# Patient Record
Sex: MALE | Race: WHITE | NOT HISPANIC OR LATINO | Employment: OTHER | URBAN - NONMETROPOLITAN AREA
[De-identification: names, ages, dates, MRNs, and addresses within clinical notes are randomized per-mention and may not be internally consistent; named-entity substitution may affect disease eponyms.]

---

## 2023-06-12 ENCOUNTER — OFFICE VISIT (OUTPATIENT)
Dept: UROLOGY | Facility: CLINIC | Age: 58
End: 2023-06-12
Payer: MEDICARE

## 2023-06-12 VITALS
DIASTOLIC BLOOD PRESSURE: 97 MMHG | WEIGHT: 219 LBS | BODY MASS INDEX: 29.66 KG/M2 | HEART RATE: 80 BPM | SYSTOLIC BLOOD PRESSURE: 123 MMHG | HEIGHT: 72 IN

## 2023-06-12 DIAGNOSIS — N48.1 BALANITIS: Primary | ICD-10-CM

## 2023-06-12 PROCEDURE — 1160F RVW MEDS BY RX/DR IN RCRD: CPT

## 2023-06-12 PROCEDURE — 99203 OFFICE O/P NEW LOW 30 MIN: CPT

## 2023-06-12 PROCEDURE — 1159F MED LIST DOCD IN RCRD: CPT

## 2023-06-12 RX ORDER — LORATADINE 10 MG/1
10 TABLET ORAL DAILY
COMMUNITY
Start: 2022-12-21

## 2023-06-12 RX ORDER — GABAPENTIN 400 MG/1
400 CAPSULE ORAL 3 TIMES DAILY
COMMUNITY
Start: 2023-01-04

## 2023-06-12 RX ORDER — LIRAGLUTIDE 6 MG/ML
0.6 INJECTION SUBCUTANEOUS
COMMUNITY
Start: 2023-04-14

## 2023-06-12 RX ORDER — NALOXONE HYDROCHLORIDE 4 MG/.1ML
1 SPRAY NASAL
COMMUNITY
Start: 2023-05-26

## 2023-06-12 RX ORDER — HYDROCODONE BITARTRATE AND ACETAMINOPHEN 7.5; 325 MG/1; MG/1
1 TABLET ORAL
COMMUNITY
Start: 2023-01-04

## 2023-06-12 RX ORDER — AMITRIPTYLINE HYDROCHLORIDE 10 MG/1
10 TABLET, FILM COATED ORAL
COMMUNITY

## 2023-06-12 RX ORDER — CLOTRIMAZOLE AND BETAMETHASONE DIPROPIONATE 10; .64 MG/G; MG/G
1 CREAM TOPICAL 2 TIMES DAILY
Qty: 45 G | Refills: 2 | Status: SHIPPED | OUTPATIENT
Start: 2023-06-12

## 2023-06-12 NOTE — PROGRESS NOTES
"Chief Complaint:    Chief Complaint   Patient presents with   • Balantis        Vital Signs:   /97 (BP Location: Right arm, Patient Position: Sitting)   Pulse 80   Ht 182.9 cm (72\")   Wt 99.3 kg (219 lb)   BMI 29.70 kg/m²   Body mass index is 29.7 kg/m².      HPI:  Jayden Cotto is a 57 y.o. male who presents today for initial evaluation     History of Present Illness  Mr. Cotto presents to the clinic for initial evaluation.  Patient reports that he has had difficulty retraction of foreskin and cracking of foreskin with notable white discharge.  Does have a past medical history significant for diabetes mellitus.  He also reports that recently he was diagnosed with syphilis by his local health department.  He received 2 injections of penicillin IM.  He was scheduled to follow-up for 1 injection however never kept appointment.  Physical exam today he has notable balanitis with difficulty retracting foreskin.  There is cracking/splitting noted around the end of the foreskin.  Patient does not have any other genital sores present at this time.  He has no other complaints.      Past Medical History:  Past Medical History:   Diagnosis Date   • Diabetes mellitus        Current Meds:  Current Outpatient Medications   Medication Sig Dispense Refill   • amitriptyline (ELAVIL) 10 MG tablet 1 tablet.     • gabapentin (NEURONTIN) 400 MG capsule Take 1 capsule by mouth 3 (Three) Times a Day.     • HYDROcodone-acetaminophen (NORCO) 7.5-325 MG per tablet 1 tablet.     • loratadine (CLARITIN) 10 MG tablet Take 1 tablet by mouth Daily.     • metFORMIN (GLUCOPHAGE) 1000 MG tablet 1 tablet.     • naloxone (NARCAN) 4 MG/0.1ML nasal spray 1 spray.     • Victoza 18 MG/3ML solution pen-injector injection 0.6 mg.     • clotrimazole-betamethasone (Lotrisone) 1-0.05 % cream Apply 1 application topically to the appropriate area as directed 2 (Two) Times a Day. 45 g 2     No current facility-administered medications for this visit.      "   Allergies:   No Known Allergies     Past Surgical History:  Past Surgical History:   Procedure Laterality Date   • HAND SURGERY Right        Social History:  Social History     Socioeconomic History   • Marital status: Single   Tobacco Use   • Smoking status: Every Day     Packs/day: 0.25     Types: Cigarettes   • Smokeless tobacco: Never   Vaping Use   • Vaping Use: Every day   • Substances: Nicotine   Substance and Sexual Activity   • Alcohol use: Never   • Drug use: Never   • Sexual activity: Never       Family History:  No family history on file.    Review of Systems:  Review of Systems   Constitutional: Negative for chills, fatigue, fever and unexpected weight change.   Respiratory: Negative for cough, chest tightness, shortness of breath and wheezing.    Cardiovascular: Negative for chest pain and leg swelling.   Gastrointestinal: Negative for abdominal pain, constipation, diarrhea, nausea and vomiting.   Genitourinary: Positive for penile pain and penile swelling. Negative for difficulty urinating, dysuria, frequency, testicular pain and urgency.   Musculoskeletal:  Negative for back pain and joint swelling.   Skin: Negative for rash.   Neurological:  Negative for dizziness and headaches.   Psychiatric/Behavioral: Negative for confusion and suicidal ideas.       Physical Exam:  Physical Exam  Constitutional:       General: He is not in acute distress.     Appearance: Normal appearance.   HENT:      Head: Normocephalic and atraumatic.      Nose: Nose normal.      Mouth/Throat:      Mouth: Mucous membranes are moist.   Eyes:      Conjunctiva/sclera: Conjunctivae normal.   Cardiovascular:      Rate and Rhythm: Normal rate and regular rhythm.      Pulses: Normal pulses.      Heart sounds: Normal heart sounds.   Pulmonary:      Effort: Pulmonary effort is normal.      Breath sounds: Normal breath sounds.   Abdominal:      General: Bowel sounds are normal.      Palpations: Abdomen is soft.      Tenderness: There  is no right CVA tenderness or left CVA tenderness.   Genitourinary:     Comments: Balanitis with notable white discharge and cracking of the foreskin with retraction.  Musculoskeletal:         General: Normal range of motion.      Cervical back: Normal range of motion.   Skin:     General: Skin is warm.   Neurological:      General: No focal deficit present.      Mental Status: He is alert and oriented to person, place, and time.   Psychiatric:         Mood and Affect: Mood normal.         Behavior: Behavior normal.         Thought Content: Thought content normal.         Judgment: Judgment normal.       Recent Image (CT and/or KUB):   CT Abdomen and Pelvis: No results found for this or any previous visit.     CT Stone Protocol: No results found for this or any previous visit.     KUB: No results found for this or any previous visit.       Labs:  Brief Urine Lab Results     None        No visits with results within 3 Month(s) from this visit.   Latest known visit with results is:   No results found for any previous visit.        Procedure: None  Procedures     I have reviewed and agree with the above PMH, PSH, FMH, social history, medications, allergies, and labs.     Assessment/Plan:   Problem List Items Addressed This Visit        Genitourinary and Reproductive     Balanitis - Primary    Relevant Medications    clotrimazole-betamethasone (Lotrisone) 1-0.05 % cream       Health Maintenance:   Health Maintenance Due   Topic Date Due   • COLORECTAL CANCER SCREENING  Never done   • COVID-19 Vaccine (1) Never done   • Pneumococcal Vaccine 0-64 (1 - PCV) Never done   • ZOSTER VACCINE (1 of 2) Never done   • HEPATITIS C SCREENING  Never done   • ANNUAL WELLNESS VISIT  Never done        Smoking Counseling: Current everyday smoker.  Never used smokeless tobacco.  Counseling given however not ready to quit at this time.    Urine Incontinence: Patient reports that he is not currently experiencing any symptoms of urinary  incontinence.    Patient was given instructions and counseling regarding his condition or for health maintenance advice. Please see specific information pulled into the AVS if appropriate.    Patient Education:   Balanitis -discussed with the patient the pathophysiology of balanitis.  I discussed the risk factors which can include but are not limited to medications, improper hygiene, diabetes mellitus, or other urological abnormalities.  Advised patient to clean area with warm soap and water twice daily.  Advised patient to apply Lotrisone cream to area twice daily.  I did discuss with the patient the use of circumcision in office.  Recommending reevaluation in 1 month.  If patient is able to perform proper hygiene daily he will not need to undergo circumcision at this time.  Advised patient to return to clinic sooner if needed.  Otherwise I will follow-up with him in 1 month.  Patient verbalized understanding and agreed to plan of care.    Visit Diagnoses:    ICD-10-CM ICD-9-CM   1. Balanitis  N48.1 607.1       Meds Ordered During Visit:  New Medications Ordered This Visit   Medications   • clotrimazole-betamethasone (Lotrisone) 1-0.05 % cream     Sig: Apply 1 application topically to the appropriate area as directed 2 (Two) Times a Day.     Dispense:  45 g     Refill:  2       Follow Up Appointment: 1 month  No follow-ups on file.      This document has been electronically signed by Justin Rodriges PA-C   June 13, 2023 12:26 EDT    Part of this note may be an electronic transcription/translation of spoken language to printed text using the Dragon Dictation System.

## 2023-06-13 PROBLEM — N48.1 BALANITIS: Status: ACTIVE | Noted: 2023-06-13

## 2024-07-08 ENCOUNTER — OFFICE VISIT (OUTPATIENT)
Dept: GASTROENTEROLOGY | Facility: CLINIC | Age: 59
End: 2024-07-08
Payer: MEDICARE

## 2024-07-08 VITALS
HEART RATE: 78 BPM | WEIGHT: 247.4 LBS | SYSTOLIC BLOOD PRESSURE: 143 MMHG | DIASTOLIC BLOOD PRESSURE: 82 MMHG | HEIGHT: 72 IN | OXYGEN SATURATION: 98 % | BODY MASS INDEX: 33.51 KG/M2

## 2024-07-08 DIAGNOSIS — I10 ESSENTIAL (PRIMARY) HYPERTENSION: ICD-10-CM

## 2024-07-08 DIAGNOSIS — E11.65 TYPE 2 DIABETES MELLITUS WITH HYPERGLYCEMIA, WITHOUT LONG-TERM CURRENT USE OF INSULIN: ICD-10-CM

## 2024-07-08 DIAGNOSIS — R53.83 OTHER FATIGUE: ICD-10-CM

## 2024-07-08 DIAGNOSIS — D69.6 THROMBOCYTOPENIA: ICD-10-CM

## 2024-07-08 DIAGNOSIS — R79.89 ELEVATED LIVER FUNCTION TESTS: Primary | ICD-10-CM

## 2024-07-08 DIAGNOSIS — F10.10 ALCOHOL ABUSE: ICD-10-CM

## 2024-07-08 LAB
ALBUMIN SERPL-MCNC: 4 G/DL (ref 3.5–5.2)
ALBUMIN/GLOB SERPL: 1.1 G/DL
ALP SERPL-CCNC: 72 U/L (ref 39–117)
ALPHA1 GLOB MFR UR ELPH: 142 MG/DL (ref 90–200)
ALT SERPL W P-5'-P-CCNC: 172 U/L (ref 1–41)
ANION GAP SERPL CALCULATED.3IONS-SCNC: 13 MMOL/L (ref 5–15)
AST SERPL-CCNC: 138 U/L (ref 1–40)
BASOPHILS # BLD AUTO: 0.03 10*3/MM3 (ref 0–0.2)
BASOPHILS NFR BLD AUTO: 0.7 % (ref 0–1.5)
BILIRUB SERPL-MCNC: 0.9 MG/DL (ref 0–1.2)
BUN SERPL-MCNC: 5 MG/DL (ref 6–20)
BUN/CREAT SERPL: 6.6 (ref 7–25)
CALCIUM SPEC-SCNC: 9.6 MG/DL (ref 8.6–10.5)
CHLORIDE SERPL-SCNC: 103 MMOL/L (ref 98–107)
CO2 SERPL-SCNC: 21 MMOL/L (ref 22–29)
CREAT SERPL-MCNC: 0.76 MG/DL (ref 0.76–1.27)
DEPRECATED RDW RBC AUTO: 39.6 FL (ref 37–54)
EGFRCR SERPLBLD CKD-EPI 2021: 103.5 ML/MIN/1.73
EOSINOPHIL # BLD AUTO: 0.12 10*3/MM3 (ref 0–0.4)
EOSINOPHIL NFR BLD AUTO: 2.9 % (ref 0.3–6.2)
ERYTHROCYTE [DISTWIDTH] IN BLOOD BY AUTOMATED COUNT: 12.2 % (ref 12.3–15.4)
FERRITIN SERPL-MCNC: 758 NG/ML (ref 30–400)
GLOBULIN UR ELPH-MCNC: 3.8 GM/DL
GLUCOSE SERPL-MCNC: 190 MG/DL (ref 65–99)
HAV IGM SERPL QL IA: ABNORMAL
HBV CORE IGM SERPL QL IA: ABNORMAL
HBV SURFACE AG SERPL QL IA: ABNORMAL
HCT VFR BLD AUTO: 43.5 % (ref 37.5–51)
HCV AB SER QL: REACTIVE
HGB BLD-MCNC: 15.2 G/DL (ref 13–17.7)
IMM GRANULOCYTES # BLD AUTO: 0.01 10*3/MM3 (ref 0–0.05)
IMM GRANULOCYTES NFR BLD AUTO: 0.2 % (ref 0–0.5)
INR PPP: 1.39 (ref 0.9–1.1)
IRON 24H UR-MRATE: 93 MCG/DL (ref 59–158)
IRON SATN MFR SERPL: 25 % (ref 20–50)
LYMPHOCYTES # BLD AUTO: 1.22 10*3/MM3 (ref 0.7–3.1)
LYMPHOCYTES NFR BLD AUTO: 29.5 % (ref 19.6–45.3)
MCH RBC QN AUTO: 31.9 PG (ref 26.6–33)
MCHC RBC AUTO-ENTMCNC: 34.9 G/DL (ref 31.5–35.7)
MCV RBC AUTO: 91.2 FL (ref 79–97)
MONOCYTES # BLD AUTO: 0.51 10*3/MM3 (ref 0.1–0.9)
MONOCYTES NFR BLD AUTO: 12.3 % (ref 5–12)
NEUTROPHILS NFR BLD AUTO: 2.25 10*3/MM3 (ref 1.7–7)
NEUTROPHILS NFR BLD AUTO: 54.4 % (ref 42.7–76)
NRBC BLD AUTO-RTO: 0 /100 WBC (ref 0–0.2)
PLATELET # BLD AUTO: 65 10*3/MM3 (ref 140–450)
PMV BLD AUTO: 12.3 FL (ref 6–12)
POTASSIUM SERPL-SCNC: 4.3 MMOL/L (ref 3.5–5.2)
PROT SERPL-MCNC: 7.8 G/DL (ref 6–8.5)
PROTHROMBIN TIME: 17.2 SECONDS (ref 12.1–14.7)
RBC # BLD AUTO: 4.77 10*6/MM3 (ref 4.14–5.8)
SODIUM SERPL-SCNC: 137 MMOL/L (ref 136–145)
TIBC SERPL-MCNC: 374 MCG/DL (ref 298–536)
TRANSFERRIN SERPL-MCNC: 251 MG/DL (ref 200–360)
WBC NRBC COR # BLD AUTO: 4.14 10*3/MM3 (ref 3.4–10.8)

## 2024-07-08 PROCEDURE — 80053 COMPREHEN METABOLIC PANEL: CPT

## 2024-07-08 PROCEDURE — 99204 OFFICE O/P NEW MOD 45 MIN: CPT

## 2024-07-08 PROCEDURE — 86258 DGP ANTIBODY EACH IG CLASS: CPT

## 2024-07-08 PROCEDURE — 86015 ACTIN ANTIBODY EACH: CPT

## 2024-07-08 PROCEDURE — 80074 ACUTE HEPATITIS PANEL: CPT

## 2024-07-08 PROCEDURE — 82172 ASSAY OF APOLIPOPROTEIN: CPT

## 2024-07-08 PROCEDURE — 85610 PROTHROMBIN TIME: CPT

## 2024-07-08 PROCEDURE — 86231 EMA EACH IG CLASS: CPT

## 2024-07-08 PROCEDURE — 82465 ASSAY BLD/SERUM CHOLESTEROL: CPT

## 2024-07-08 PROCEDURE — 82784 ASSAY IGA/IGD/IGG/IGM EACH: CPT

## 2024-07-08 PROCEDURE — 82728 ASSAY OF FERRITIN: CPT

## 2024-07-08 PROCEDURE — 86364 TISS TRNSGLTMNASE EA IG CLAS: CPT

## 2024-07-08 PROCEDURE — 85025 COMPLETE CBC W/AUTO DIFF WBC: CPT

## 2024-07-08 PROCEDURE — 84478 ASSAY OF TRIGLYCERIDES: CPT

## 2024-07-08 PROCEDURE — 1160F RVW MEDS BY RX/DR IN RCRD: CPT

## 2024-07-08 PROCEDURE — 86038 ANTINUCLEAR ANTIBODIES: CPT

## 2024-07-08 PROCEDURE — 84466 ASSAY OF TRANSFERRIN: CPT

## 2024-07-08 PROCEDURE — 82103 ALPHA-1-ANTITRYPSIN TOTAL: CPT

## 2024-07-08 PROCEDURE — 83540 ASSAY OF IRON: CPT

## 2024-07-08 PROCEDURE — 83883 ASSAY NEPHELOMETRY NOT SPEC: CPT

## 2024-07-08 PROCEDURE — 82977 ASSAY OF GGT: CPT

## 2024-07-08 PROCEDURE — 83010 ASSAY OF HAPTOGLOBIN QUANT: CPT

## 2024-07-08 PROCEDURE — 86376 MICROSOMAL ANTIBODY EACH: CPT

## 2024-07-08 PROCEDURE — 1159F MED LIST DOCD IN RCRD: CPT

## 2024-07-08 NOTE — PROGRESS NOTES
"Date of Consultation:  7/8/2024  Referring Physician: Whit Coleman,*    Chief Complaint  Elevated Hepatic Enzymes    Jayden Cotto is a 58 y.o. male who presents today to [facility] for Elevated Hepatic Enzymes.    HPI:   58-year-old male with PMH of chronic pain s/p herniated disc chronic opioid therapy and DM2 who presents today due to elevated liver enzymes.  Patient had labs performed on 6/20/2024 was notable for , .  Platelets 122.Patient reports that for the last 6 months he has been drinking 1-2 beers each day to \"help me pee.\"  He states this is the first time he has been told that he has had elevated liver enzymes.   He denies abdominal distention, abdominal pain, jaundice, rash, itching, pedal edema, frequent Tylenol use, IVDU, unintentional weight loss, acid reflux, nausea, vomiting, hematemesis, change in bowel habits, diarrhea, constipation melena, hematochezia.    Of note, patient had a colonoscopy performed in 2015 in [facility].  States that this was notable for 1 polyp.        Previous History:   Past Medical History:   Diagnosis Date    Diabetes mellitus       Past Surgical History:   Procedure Laterality Date    HAND SURGERY Right       Social History     Socioeconomic History    Marital status: Single   Tobacco Use    Smoking status: Every Day     Current packs/day: 0.25     Types: Cigarettes    Smokeless tobacco: Never   Vaping Use    Vaping status: Every Day    Substances: Nicotine   Substance and Sexual Activity    Alcohol use: Never    Drug use: Never    Sexual activity: Never        Current Medications:  Current Outpatient Medications   Medication Sig Dispense Refill    amitriptyline (ELAVIL) 10 MG tablet 1 tablet.      clotrimazole-betamethasone (Lotrisone) 1-0.05 % cream Apply 1 application topically to the appropriate area as directed 2 (Two) Times a Day. 45 g 2    DULoxetine (CYMBALTA) 30 MG capsule 1 capsule.   " "   gabapentin (NEURONTIN) 400 MG capsule Take 1 capsule by mouth 3 (Three) Times a Day.      HYDROcodone-acetaminophen (NORCO) 7.5-325 MG per tablet 1 tablet.      Jardiance 25 MG tablet tablet 1 tablet.      loratadine (CLARITIN) 10 MG tablet Take 1 tablet by mouth Daily.      metFORMIN (GLUCOPHAGE) 1000 MG tablet 1 tablet.      naloxone (NARCAN) 4 MG/0.1ML nasal spray 1 spray.      Ozempic, 0.25 or 0.5 MG/DOSE, 2 MG/3ML solution pen-injector 0.25 mg.      Victoza 18 MG/3ML solution pen-injector injection 0.6 mg.      vitamin D (ERGOCALCIFEROL) 1.25 MG (98508 UT) capsule capsule 1 capsule.       No current facility-administered medications for this visit.       Allergies:   Allergies   Allergen Reactions    Motrin [Ibuprofen] Shortness Of Breath       Vitals:   /82   Pulse 78   Ht 182.9 cm (72\")   Wt 112 kg (247 lb 6.4 oz)   SpO2 98%   BMI 33.55 kg/m²   Estimated body mass index is 33.55 kg/m² as calculated from the following:    Height as of this encounter: 182.9 cm (72\").    Weight as of this encounter: 112 kg (247 lb 6.4 oz).    Jayden Cotto  reports that he has been smoking cigarettes. He has never used smokeless tobacco. I have educated him on the risk of diseases from using tobacco products such as cancer, COPD, and heart disease.       ROS:   Review of Systems   Constitutional: Negative.    HENT: Negative.     Respiratory: Negative.     Cardiovascular: Negative.    Gastrointestinal: Negative.    All other systems reviewed and are negative.       Physical Exam:   Physical Exam  Vitals reviewed.   Constitutional:       Appearance: Normal appearance. He is obese.   HENT:      Head: Normocephalic and atraumatic.      Nose: Nose normal.      Mouth/Throat:      Mouth: Mucous membranes are moist.   Eyes:      Extraocular Movements: Extraocular movements intact.   Cardiovascular:      Rate and Rhythm: Normal rate and regular rhythm.      Pulses: Normal pulses.      Heart sounds: Normal heart sounds. "   Pulmonary:      Effort: Pulmonary effort is normal.      Breath sounds: Normal breath sounds.   Abdominal:      General: Abdomen is flat. Bowel sounds are normal. There is no distension.      Palpations: Abdomen is soft. There is no mass.      Tenderness: There is no abdominal tenderness. There is no guarding or rebound.      Hernia: No hernia is present.   Musculoskeletal:         General: Normal range of motion.      Cervical back: Normal range of motion.   Skin:     General: Skin is warm and dry.   Neurological:      Mental Status: He is alert and oriented to person, place, and time.   Psychiatric:         Mood and Affect: Mood normal.         Behavior: Behavior normal.         Thought Content: Thought content normal.          Lab Results:   Lab Results   Component Value Date    WBC 5.9 02/13/2015    HGB 16.8 02/13/2015    HCT 49.7 02/13/2015    MCV 86.4 02/13/2015    RDW 13.9 02/13/2015     02/13/2015    NEUTRORELPCT 56.3 02/13/2015    LYMPHORELPCT 32.7 02/13/2015    MONORELPCT 10.0 02/13/2015    EOSRELPCT 0.7 02/13/2015    BASORELPCT 0.3 02/13/2015    NEUTROABS 3.3 02/13/2015    LYMPHSABS 1.9 02/13/2015       Lab Results   Component Value Date     02/13/2015    K 4.1 02/13/2015    CO2 30.2 02/13/2015     02/13/2015    BUN 8 02/13/2015    CREATININE 0.99 02/13/2015    GLUCOSE 125 (H) 02/13/2015    CALCIUM 10.2 (H) 02/13/2015    ALKPHOS 78 02/13/2015    AST 41 (H) 02/13/2015    ALT 61 (H) 02/13/2015    BILITOT 1.1 02/13/2015    ALBUMIN 4.6 02/13/2015    PROTEINTOT 8.0 02/13/2015       Pathology:        Endoscopy:        Imaging:         Results review: During today's encounter, all relevant clinical data has been reviewed.      Assessment and Plan    1. Elevated liver function tests (Primary)  2. Alcohol abuse  3. Thrombocytopenia  4. Type 2 diabetes mellitus with hyperglycemia, without long-term current use of insulin  5. Essential (primary) hypertension  6. Other fatigue  Patient recent  "AST/ALT notable for significant elevation.  Suspect that frequent alcohol use largely contributing.  Patient endorses alcohol cessation.  Will obtain the following labs and ultrasound for further evaluation  -     Hepatitis Panel, Acute  -     Alpha - 1 - Antitrypsin  -     PREET  -     Anti-microsomal Antibody  -     Anti-Smooth Muscle Antibody Titer  -     CBC & Differential  -     Celiac Comprehensive Panel  -     Comprehensive Metabolic Panel  -     US Liver; Future  -     Protime-INR  -     AUSTIN Fibrosure Plus  -     Iron Profile  -     Hepatitis Panel, Acute  -     Ferritin  -     Endomysial Antibody IgA  -     AUSTIN Fibrosure Plus  Patient informed that he is due for colonoscopy for colorectal cancer screening.  Patient adamantly is refusing colonoscopy as he does not want to complete colon prep.  Educated on risk of deferring colonoscopy for colorectal cancer screening including delayed diagnosis of colorectal cancer.  Patient verbalizes understanding and states \"if I have cancer I do not want to know.\"  Educated patient that he would need to complete a Cologuard.  This could be ordered by his primary care provider physician.  He verbalizes understanding and is amenable to proceeding with this.      New Medications:   No orders of the defined types were placed in this encounter.      Discontinued Medications:   There are no discontinued medications.     Visit Diagnoses:    ICD-10-CM ICD-9-CM   1. Elevated liver function tests  R79.89 790.6   2. Alcohol abuse  F10.10 305.00   3. Thrombocytopenia  D69.6 287.5   4. Type 2 diabetes mellitus with hyperglycemia, without long-term current use of insulin  E11.65 250.00     790.29   5. Essential (primary) hypertension  I10 401.9   6. Other fatigue  R53.83 780.79            Follow Up:   Return in about 3 months (around 10/8/2024).    The patient was in agreement with the plan and all questions were answered to patient's satisfaction.        This document has been " electronically signed by Cherrie Le PA-C   July 8, 2024 11:56 EDT    Dictated Utilizing Dragon Dictation: Part of this note may be an electronic transcription/translation of spoken language to printed text using the Dragon Dictation System.    CC:  Whit Coleman,Osiris Hernandez APRN

## 2024-07-09 LAB
A2 MACROGLOB SERPL-MCNC: 448 MG/DL (ref 110–276)
ALT SERPL W P-5'-P-CCNC: 191 IU/L (ref 0–55)
ANA SER QL: NEGATIVE
APO A-I SERPL-MCNC: 141 MG/DL (ref 101–178)
AST SERPL W P-5'-P-CCNC: 147 IU/L (ref 0–40)
BILIRUB SERPL-MCNC: 0.8 MG/DL (ref 0–1.2)
CHOLEST SERPL-MCNC: 167 MG/DL (ref 100–199)
ENDOMYSIUM IGA SER QL: NEGATIVE
ENDOMYSIUM IGA SER QL: NEGATIVE
FIBROSIS SCORING:: ABNORMAL
FIBROSIS STAGE SERPL QL: ABNORMAL
GGT SERPL-CCNC: 144 IU/L (ref 0–65)
GLIADIN PEPTIDE IGA SER-ACNC: 4 UNITS (ref 0–19)
GLIADIN PEPTIDE IGG SER-ACNC: 2 UNITS (ref 0–19)
GLUCOSE SERPL-MCNC: 189 MG/DL (ref 70–99)
HAPTOGLOB SERPL-MCNC: 37 MG/DL (ref 29–370)
IGA SERPL-MCNC: 254 MG/DL (ref 90–386)
LABORATORY COMMENT REPORT: ABNORMAL
LIVER FIBR SCORE SERPL CALC.FIBROSURE: 0.92 (ref 0–0.21)
LIVER STEATOSIS GRADE SERPL QL: ABNORMAL
LIVER STEATOSIS SCORE SERPL: 0.79 (ref 0–0.4)
LKM-1 AB SER-ACNC: <1 UNITS (ref 0–20)
NASH GRADE SERPL QL: ABNORMAL
NASH INTERPRETATION SERPL-IMP: ABNORMAL
NASH SCORE SERPL: 0.97 (ref 0–0.25)
NASH SCORING: ABNORMAL
SMA IGG SER-ACNC: 5 UNITS (ref 0–19)
STEATOSIS SCORING: ABNORMAL
TEST PERFORMANCE INFO SPEC: ABNORMAL
TEST PERFORMANCE INFO SPEC: ABNORMAL
TRIGL SERPL-MCNC: 155 MG/DL (ref 0–149)
TTG IGA SER-ACNC: <2 U/ML (ref 0–3)
TTG IGG SER-ACNC: 4 U/ML (ref 0–5)

## 2024-07-11 ENCOUNTER — TELEPHONE (OUTPATIENT)
Dept: GASTROENTEROLOGY | Facility: CLINIC | Age: 59
End: 2024-07-11
Payer: MEDICARE

## 2024-07-11 DIAGNOSIS — K74.60 CIRRHOSIS OF LIVER WITHOUT ASCITES, UNSPECIFIED HEPATIC CIRRHOSIS TYPE: Primary | ICD-10-CM

## 2024-07-11 NOTE — TELEPHONE ENCOUNTER
Called to discuss results of recent lab work.  Discussed with patient that #5 pressure was notable for significant steatosis and fibrosis and possible cirrhosis.  Educated patient that this is not necessarily diagnostic of cirrhosis at this time and it was important that he proceed with planned liver ultrasound.  He reports that he is liver ultrasound is scheduled for August 15, 2024.  Also discussed with patient elevated liver enzymes.   and .  Although these are elevated, they are improving from his priors of  and .  Also discussed with patient thrombocytopenia of 65 significantly decreased from prior 122.  Interestingly, patient did have a positive hep C antibody.  He denies any high risk sexual behavior, IVDU, or intranasal drug use at any point.  He does not have any tattoos.  I have encouraged patient to return to office or [facility] laboratory to have a hepatitis C quant and viral load drawn.  He verbalizes understanding and is amenable with proceeding.  Pending hepatitis C viral load, will plan to refer patient to hepatitis clinic for further treatment.

## 2024-07-12 ENCOUNTER — LAB (OUTPATIENT)
Dept: UROLOGY | Facility: CLINIC | Age: 59
End: 2024-07-12
Payer: MEDICARE

## 2024-07-12 ENCOUNTER — LAB (OUTPATIENT)
Dept: LAB | Facility: HOSPITAL | Age: 59
End: 2024-07-12
Payer: MEDICARE

## 2024-07-12 DIAGNOSIS — K74.60 CIRRHOSIS OF LIVER WITHOUT ASCITES, UNSPECIFIED HEPATIC CIRRHOSIS TYPE: ICD-10-CM

## 2024-07-12 PROCEDURE — 36415 COLL VENOUS BLD VENIPUNCTURE: CPT

## 2024-07-12 PROCEDURE — 87522 HEPATITIS C REVRS TRNSCRPJ: CPT

## 2024-07-12 PROCEDURE — 85025 COMPLETE CBC W/AUTO DIFF WBC: CPT

## 2024-07-12 PROCEDURE — 85007 BL SMEAR W/DIFF WBC COUNT: CPT

## 2024-07-12 PROCEDURE — 87902 NFCT AGT GNTYP ALYS HEP C: CPT

## 2024-07-13 LAB
BASOPHILS # BLD AUTO: 0.05 10*3/MM3 (ref 0–0.2)
BASOPHILS NFR BLD AUTO: 1 % (ref 0–1.5)
DEPRECATED RDW RBC AUTO: 41.4 FL (ref 37–54)
EOSINOPHIL # BLD AUTO: 0.11 10*3/MM3 (ref 0–0.4)
EOSINOPHIL NFR BLD AUTO: 2.2 % (ref 0.3–6.2)
ERYTHROCYTE [DISTWIDTH] IN BLOOD BY AUTOMATED COUNT: 12.2 % (ref 12.3–15.4)
HCT VFR BLD AUTO: 44.1 % (ref 37.5–51)
HGB BLD-MCNC: 15.2 G/DL (ref 13–17.7)
IMM GRANULOCYTES # BLD AUTO: 0.01 10*3/MM3 (ref 0–0.05)
IMM GRANULOCYTES NFR BLD AUTO: 0.2 % (ref 0–0.5)
LYMPHOCYTES # BLD AUTO: 1.46 10*3/MM3 (ref 0.7–3.1)
LYMPHOCYTES NFR BLD AUTO: 28.7 % (ref 19.6–45.3)
MCH RBC QN AUTO: 32.1 PG (ref 26.6–33)
MCHC RBC AUTO-ENTMCNC: 34.5 G/DL (ref 31.5–35.7)
MCV RBC AUTO: 93.2 FL (ref 79–97)
MONOCYTES # BLD AUTO: 0.49 10*3/MM3 (ref 0.1–0.9)
MONOCYTES NFR BLD AUTO: 9.6 % (ref 5–12)
NEUTROPHILS NFR BLD AUTO: 2.96 10*3/MM3 (ref 1.7–7)
NEUTROPHILS NFR BLD AUTO: 58.3 % (ref 42.7–76)
PLATELET # BLD AUTO: 94 10*3/MM3 (ref 140–450)
PMV BLD AUTO: 10.9 FL (ref 6–12)
RBC # BLD AUTO: 4.73 10*6/MM3 (ref 4.14–5.8)
SMALL PLATELETS BLD QL SMEAR: NORMAL
WBC NRBC COR # BLD AUTO: 5.08 10*3/MM3 (ref 3.4–10.8)

## 2024-07-17 LAB
HCV GENTYP SERPL NAA+PROBE: NORMAL
HCV GENTYP SERPL NAA+PROBE: NORMAL
HCV RNA SERPL NAA+PROBE-ACNC: NORMAL IU/ML
HCV RNA SERPL NAA+PROBE-LOG IU: 5.21 LOG10 IU/ML
LABORATORY COMMENT REPORT: NORMAL

## 2024-07-24 DIAGNOSIS — K74.60 CIRRHOSIS OF LIVER WITHOUT ASCITES, UNSPECIFIED HEPATIC CIRRHOSIS TYPE: Primary | ICD-10-CM

## 2024-07-25 DIAGNOSIS — B18.2 CHRONIC HEPATITIS C WITHOUT HEPATIC COMA: ICD-10-CM

## 2024-07-25 DIAGNOSIS — K74.60 CIRRHOSIS OF LIVER WITHOUT ASCITES, UNSPECIFIED HEPATIC CIRRHOSIS TYPE: ICD-10-CM

## 2024-07-25 DIAGNOSIS — R79.89 ELEVATED LIVER FUNCTION TESTS: Primary | ICD-10-CM

## 2024-08-07 ENCOUNTER — SPECIALTY PHARMACY (OUTPATIENT)
Dept: PHARMACY | Facility: HOSPITAL | Age: 59
End: 2024-08-07
Payer: MEDICARE

## 2024-08-07 ENCOUNTER — LAB (OUTPATIENT)
Dept: LAB | Facility: HOSPITAL | Age: 59
End: 2024-08-07
Payer: MEDICARE

## 2024-08-07 VITALS
HEIGHT: 72 IN | WEIGHT: 252.6 LBS | HEART RATE: 74 BPM | DIASTOLIC BLOOD PRESSURE: 74 MMHG | BODY MASS INDEX: 34.21 KG/M2 | SYSTOLIC BLOOD PRESSURE: 146 MMHG | OXYGEN SATURATION: 96 %

## 2024-08-07 DIAGNOSIS — G89.29 OTHER CHRONIC PAIN: ICD-10-CM

## 2024-08-07 DIAGNOSIS — Z11.59 ENCOUNTER FOR SCREENING FOR OTHER VIRAL DISEASES: ICD-10-CM

## 2024-08-07 DIAGNOSIS — B18.2 CHRONIC HEPATITIS C WITHOUT HEPATIC COMA: Primary | ICD-10-CM

## 2024-08-07 PROCEDURE — G0432 EIA HIV-1/HIV-2 SCREEN: HCPCS

## 2024-08-07 PROCEDURE — 99213 OFFICE O/P EST LOW 20 MIN: CPT | Performed by: PHYSICIAN ASSISTANT

## 2024-08-07 PROCEDURE — 87340 HEPATITIS B SURFACE AG IA: CPT

## 2024-08-07 PROCEDURE — 86704 HEP B CORE ANTIBODY TOTAL: CPT

## 2024-08-07 PROCEDURE — 86706 HEP B SURFACE ANTIBODY: CPT

## 2024-08-07 PROCEDURE — 86708 HEPATITIS A ANTIBODY: CPT

## 2024-08-07 PROCEDURE — 82105 ALPHA-FETOPROTEIN SERUM: CPT

## 2024-08-07 NOTE — PROGRESS NOTES
No chief complaint on file.    Jayden Cotto is a 58 y.o. male who presents to the office today at the request of Cherrie Le PA-C for No chief complaint on file..    HPI  He found out about having Hepatitis C infection approx 1 month ago. He has not had prior treatment for hepatitis. Reports a new dx of cirrhosis. There is no known family history of liver disease or cirrhosis. He denies previous IVDU and intranasal drug use. He does not have nonprofessional tattoos. Denies having previous alcoholism. He does not currently drink alcohol. He denies current illicit drug use including marijuana. No recent liver imaging. He has had recent labs. He  is unsure of  previous vaccinations for Hepatitis A and B. He is currently unemployed, disabled. The patient receives support from his significant other..       Review of Systems   Constitutional:  Negative for activity change, appetite change and fatigue.   HENT:  Negative for trouble swallowing and voice change.    Respiratory:  Negative for cough and choking.    Cardiovascular:  Negative for leg swelling.   Gastrointestinal:  Negative for abdominal distention, abdominal pain, anal bleeding, blood in stool, constipation, diarrhea, nausea, rectal pain and vomiting.   Endocrine: Negative for polyphagia.   Genitourinary:  Negative for flank pain.   Musculoskeletal:  Positive for back pain.   Skin:  Negative for color change and pallor.   Allergic/Immunologic: Negative for food allergies.   Neurological:  Negative for weakness.   Psychiatric/Behavioral:  Negative for confusion.        ACTIVE PROBLEMS:   Specialty Problems    None      PAST MEDICAL HISTORY:  Past Medical History:   Diagnosis Date    Diabetes mellitus        SURGICAL HISTORY:  Past Surgical History:   Procedure Laterality Date    EXTERNAL EAR SURGERY Right     HAND SURGERY Right        FAMILY HISTORY:  History reviewed. No pertinent family history.    SOCIAL HISTORY:  Social History     Tobacco Use    Smoking  "status: Every Day     Current packs/day: 0.25     Types: Cigarettes     Passive exposure: Current    Smokeless tobacco: Never   Substance Use Topics    Alcohol use: Not Currently     Comment: Occasionally       CURRENT MEDICATION:    Current Outpatient Medications:     amitriptyline (ELAVIL) 10 MG tablet, 1 tablet., Disp: , Rfl:     clotrimazole-betamethasone (Lotrisone) 1-0.05 % cream, Apply 1 application topically to the appropriate area as directed 2 (Two) Times a Day., Disp: 45 g, Rfl: 2    DULoxetine (CYMBALTA) 30 MG capsule, 1 capsule., Disp: , Rfl:     gabapentin (NEURONTIN) 400 MG capsule, Take 1 capsule by mouth 3 (Three) Times a Day., Disp: , Rfl:     HYDROcodone-acetaminophen (NORCO) 7.5-325 MG per tablet, 1 tablet., Disp: , Rfl:     Jardiance 25 MG tablet tablet, 1 tablet., Disp: , Rfl:     loratadine (CLARITIN) 10 MG tablet, Take 1 tablet by mouth Daily., Disp: , Rfl:     metFORMIN (GLUCOPHAGE) 1000 MG tablet, 1 tablet., Disp: , Rfl:     naloxone (NARCAN) 4 MG/0.1ML nasal spray, 1 spray., Disp: , Rfl:     Ozempic, 0.25 or 0.5 MG/DOSE, 2 MG/3ML solution pen-injector, 0.25 mg., Disp: , Rfl:     vitamin D (ERGOCALCIFEROL) 1.25 MG (51585 UT) capsule capsule, 1 capsule., Disp: , Rfl:     Victoza 18 MG/3ML solution pen-injector injection, 0.6 mg. (Patient not taking: Reported on 8/7/2024), Disp: , Rfl:     ALLERGIES:  Motrin [ibuprofen]    VISIT VITALS:  Blood Pressure 146/74 (BP Location: Right arm, Patient Position: Sitting, Cuff Size: Adult)   Pulse 74   Height 182.9 cm (72\")   Weight 115 kg (252 lb 9.6 oz)   Oxygen Saturation 96%   Body Mass Index 34.26 kg/m²     PHYSICAL EXAMINATION:  Physical Exam  Constitutional:       General: He is not in acute distress.     Appearance: He is well-developed. He is not diaphoretic.   HENT:      Head: Normocephalic and atraumatic.      Right Ear: External ear normal.      Left Ear: External ear normal.      Nose: Nose normal.      Mouth/Throat:      Pharynx: No " oropharyngeal exudate.   Eyes:      General: No scleral icterus.        Right eye: No discharge.         Left eye: No discharge.      Conjunctiva/sclera: Conjunctivae normal.      Pupils: Pupils are equal, round, and reactive to light.   Neck:      Thyroid: No thyromegaly.      Vascular: No JVD.      Trachea: No tracheal deviation.   Cardiovascular:      Rate and Rhythm: Normal rate and regular rhythm.      Heart sounds: Normal heart sounds. No murmur heard.     No friction rub. No gallop.   Pulmonary:      Effort: Pulmonary effort is normal. No respiratory distress.      Breath sounds: Normal breath sounds. No stridor. No wheezing or rales.   Chest:      Chest wall: No tenderness.   Abdominal:      General: Bowel sounds are normal. There is no distension.      Palpations: Abdomen is soft. There is no mass.      Tenderness: There is no abdominal tenderness. There is no guarding or rebound.      Hernia: No hernia is present.   Genitourinary:     Rectum: Guaiac result negative.   Musculoskeletal:      Cervical back: Normal range of motion and neck supple.   Lymphadenopathy:      Cervical: No cervical adenopathy.   Skin:     General: Skin is warm and dry.      Coloration: Skin is not pale.      Findings: No erythema or rash.   Neurological:      Mental Status: He is alert and oriented to person, place, and time.      Cranial Nerves: No cranial nerve deficit.      Motor: No abnormal muscle tone.      Coordination: Coordination normal.      Deep Tendon Reflexes: Reflexes are normal and symmetric. Reflexes normal.   Psychiatric:         Behavior: Behavior normal.         Thought Content: Thought content normal.         Judgment: Judgment normal.         Assessment & Plan      Diagnosis Plan   1. Chronic hepatitis C without hepatic coma  AFP Tumor Marker    HCV FibroSURE    Hepatitis A Antibody, Total    Hepatitis B Core Antibody, Total    Hepatitis B Surface Antibody    Hepatitis B Surface Antigen    Urine Drug Screen -  Urine, Clean Catch    HIV-1 & HIV-2 Antibodies      2. Encounter for screening for other viral diseases  Hepatitis B Core Antibody, Total    Hepatitis B Surface Antibody    Hepatitis B Surface Antigen      3. Other chronic pain  Urine Drug Screen - Urine, Clean Catch        The patient will complete initial lab work and liver US in order to begin Hepatitis C treatment.  The patient will return in two weeks to discuss results and begin treatment if warranted.   Return in about 2 weeks (around 8/21/2024) for Recheck.           Victorino Lee PA-C

## 2024-08-08 LAB
A2 MACROGLOB SERPL-MCNC: 477 MG/DL (ref 110–276)
ALPHA-FETOPROTEIN: 3.17 NG/ML (ref 0–8.3)
ALT SERPL W P-5'-P-CCNC: 82 IU/L (ref 0–55)
APO A-I SERPL-MCNC: 160 MG/DL (ref 101–178)
BILIRUB SERPL-MCNC: 1.3 MG/DL (ref 0–1.2)
FIBROSIS SCORING:: ABNORMAL
FIBROSIS STAGE SERPL QL: ABNORMAL
GGT SERPL-CCNC: 105 IU/L (ref 0–65)
HAPTOGLOB SERPL-MCNC: 29 MG/DL (ref 29–370)
HAV AB SER QL IA: POSITIVE
HBV CORE AB SERPL QL IA: NEGATIVE
HBV SURFACE AB SER RIA-ACNC: NORMAL
HBV SURFACE AG SERPL QL IA: NORMAL
HCV AB SER QL: ABNORMAL
HIV 1+2 AB+HIV1 P24 AG SERPL QL IA: NORMAL
LABORATORY COMMENT REPORT: ABNORMAL
LIVER FIBR SCORE SERPL CALC.FIBROSURE: 0.94 (ref 0–0.21)
NECROINFLAMM ACTIVITY SCORING:: ABNORMAL
NECROINFLAMMATORY ACT GRADE SERPL QL: ABNORMAL
NECROINFLAMMATORY ACT SCORE SERPL: 0.71 (ref 0–0.17)
SERVICE CMNT-IMP: ABNORMAL
TEST PERFORMANCE INFO SPEC: ABNORMAL

## 2024-08-21 ENCOUNTER — APPOINTMENT (OUTPATIENT)
Dept: PHARMACY | Facility: HOSPITAL | Age: 59
End: 2024-08-21
Payer: MEDICARE

## 2024-09-04 ENCOUNTER — OFFICE VISIT (OUTPATIENT)
Dept: SURGERY | Facility: CLINIC | Age: 59
End: 2024-09-04
Payer: MEDICARE

## 2024-09-04 ENCOUNTER — SPECIALTY PHARMACY (OUTPATIENT)
Dept: PHARMACY | Facility: HOSPITAL | Age: 59
End: 2024-09-04
Payer: MEDICARE

## 2024-09-04 ENCOUNTER — HOSPITAL ENCOUNTER (OUTPATIENT)
Dept: ULTRASOUND IMAGING | Facility: HOSPITAL | Age: 59
Discharge: HOME | End: 2024-09-04
Payer: MEDICARE

## 2024-09-04 ENCOUNTER — TELEPHONE (OUTPATIENT)
Dept: GASTROENTEROLOGY | Facility: CLINIC | Age: 59
End: 2024-09-04
Payer: MEDICARE

## 2024-09-04 VITALS
WEIGHT: 255 LBS | SYSTOLIC BLOOD PRESSURE: 130 MMHG | HEIGHT: 72 IN | DIASTOLIC BLOOD PRESSURE: 72 MMHG | BODY MASS INDEX: 34.54 KG/M2

## 2024-09-04 VITALS
WEIGHT: 255 LBS | HEIGHT: 72 IN | SYSTOLIC BLOOD PRESSURE: 137 MMHG | BODY MASS INDEX: 34.54 KG/M2 | HEART RATE: 86 BPM | DIASTOLIC BLOOD PRESSURE: 70 MMHG | OXYGEN SATURATION: 98 %

## 2024-09-04 DIAGNOSIS — R79.89 ELEVATED LIVER FUNCTION TESTS: ICD-10-CM

## 2024-09-04 DIAGNOSIS — B18.2 CHRONIC HEPATITIS C WITHOUT HEPATIC COMA: Primary | ICD-10-CM

## 2024-09-04 DIAGNOSIS — D69.6 THROMBOCYTOPENIA: ICD-10-CM

## 2024-09-04 DIAGNOSIS — B19.20 HEPATITIS C VIRUS INFECTION WITHOUT HEPATIC COMA, UNSPECIFIED CHRONICITY: Primary | ICD-10-CM

## 2024-09-04 DIAGNOSIS — F10.10 ALCOHOL ABUSE: ICD-10-CM

## 2024-09-04 DIAGNOSIS — E11.65 TYPE 2 DIABETES MELLITUS WITH HYPERGLYCEMIA, WITHOUT LONG-TERM CURRENT USE OF INSULIN: ICD-10-CM

## 2024-09-04 PROCEDURE — 99203 OFFICE O/P NEW LOW 30 MIN: CPT

## 2024-09-04 PROCEDURE — 1160F RVW MEDS BY RX/DR IN RCRD: CPT

## 2024-09-04 PROCEDURE — 76705 ECHO EXAM OF ABDOMEN: CPT

## 2024-09-04 PROCEDURE — 1159F MED LIST DOCD IN RCRD: CPT

## 2024-09-04 NOTE — PROGRESS NOTES
"No chief complaint on file.      Jayden Cotto is a 58 y.o. male who presents to the office today for follow up appointment for No chief complaint on file.  .    HPI    The patient was seen for a followup visit to discuss results of initial testing for Hepatitis C.  Liver US: Unremarkable sonographic appearance of the liver. .    See lab results below.     Relevant Laboratory Values   Lab Results   Component Value Date    GLUCOSE 190 (H) 07/08/2024    CALCIUM 9.6 07/08/2024     07/08/2024    K 4.3 07/08/2024    CO2 21.0 (L) 07/08/2024     07/08/2024    BUN 5 (L) 07/08/2024    CREATININE 0.76 07/08/2024    BCR 6.6 (L) 07/08/2024    ANIONGAP 13.0 07/08/2024     (H) 07/08/2024     (H) 07/08/2024      Lab Results   Component Value Date    WBC 5.08 07/12/2024    HGB 15.2 07/12/2024    HCT 44.1 07/12/2024    MCV 93.2 07/12/2024    PLT 94 (L) 07/12/2024    No results found for: \"HCVRNA\"  161,000   Hepatitis C Genotype   Date Value Ref Range Status   07/12/2024 1a  Final     HCV Genotype   Date Value Ref Range Status   07/12/2024 Comment  Final     Comment:     To be performed on this specimen.      Lab Results   Component Value Date    HAV Positive (A) 08/07/2024    HEPAIGM Non-Reactive 07/08/2024    HEPBCAB Negative 08/07/2024        Hep B surface antibody nonreactive  Hep B surface antigen  negative;    HIV nonreactive  PT-INR elevated  HCV fibrosure F4;  AFP tumor marker normal        Review of Systems   Constitutional:  Negative for activity change, appetite change and fatigue.   HENT:  Negative for trouble swallowing and voice change.    Respiratory:  Negative for cough and choking.    Cardiovascular:  Negative for leg swelling.   Gastrointestinal:  Negative for abdominal distention, abdominal pain, anal bleeding, blood in stool, constipation, diarrhea, nausea, rectal pain and vomiting.   Endocrine: Negative for polyphagia.   Genitourinary:  Negative for flank pain.   Musculoskeletal:  Negative " for back pain.   Skin:  Negative for color change and pallor.   Allergic/Immunologic: Negative for food allergies.   Neurological:  Negative for weakness.   Psychiatric/Behavioral:  Negative for confusion.        ACTIVE PROBLEMS:   Specialty Problems    None      PAST MEDICAL HISTORY:  Past Medical History:   Diagnosis Date    Diabetes mellitus        SURGICAL HISTORY:  Past Surgical History:   Procedure Laterality Date    EXTERNAL EAR SURGERY Right     HAND SURGERY Right        FAMILY HISTORY:  History reviewed. No pertinent family history.    SOCIAL HISTORY:  Social History     Tobacco Use    Smoking status: Every Day     Current packs/day: 0.25     Types: Cigarettes     Passive exposure: Current    Smokeless tobacco: Never   Substance Use Topics    Alcohol use: Not Currently     Comment: Occasionally       CURRENT MEDICATION:    Current Outpatient Medications:     amitriptyline (ELAVIL) 10 MG tablet, 1 tablet., Disp: , Rfl:     clotrimazole-betamethasone (Lotrisone) 1-0.05 % cream, Apply 1 application topically to the appropriate area as directed 2 (Two) Times a Day., Disp: 45 g, Rfl: 2    DULoxetine (CYMBALTA) 30 MG capsule, 1 capsule., Disp: , Rfl:     gabapentin (NEURONTIN) 400 MG capsule, Take 1 capsule by mouth 3 (Three) Times a Day., Disp: , Rfl:     HYDROcodone-acetaminophen (NORCO) 7.5-325 MG per tablet, 1 tablet., Disp: , Rfl:     Jardiance 25 MG tablet tablet, 1 tablet., Disp: , Rfl:     loratadine (CLARITIN) 10 MG tablet, Take 1 tablet by mouth Daily., Disp: , Rfl:     metFORMIN (GLUCOPHAGE) 1000 MG tablet, 1 tablet., Disp: , Rfl:     naloxone (NARCAN) 4 MG/0.1ML nasal spray, 1 spray., Disp: , Rfl:     Ozempic, 0.25 or 0.5 MG/DOSE, 2 MG/3ML solution pen-injector, 0.25 mg., Disp: , Rfl:     Victoza 18 MG/3ML solution pen-injector injection, 0.6 mg., Disp: , Rfl:     vitamin D (ERGOCALCIFEROL) 1.25 MG (67664 UT) capsule capsule, 1 capsule., Disp: , Rfl:     ALLERGIES:  Motrin [ibuprofen]    VISIT  "VITALS:  Blood Pressure 137/70 (BP Location: Right arm, Patient Position: Sitting, Cuff Size: Adult)   Pulse 86   Height 182.9 cm (72\")   Weight 116 kg (255 lb)   Oxygen Saturation 98%   Body Mass Index 34.58 kg/m²     Physical Exam  Constitutional:       General: He is not in acute distress.     Appearance: He is well-developed. He is obese. He is not diaphoretic.   HENT:      Head: Normocephalic and atraumatic.      Right Ear: External ear normal.      Left Ear: External ear normal.      Nose: Nose normal.      Mouth/Throat:      Pharynx: No oropharyngeal exudate.   Eyes:      General: No scleral icterus.        Right eye: No discharge.         Left eye: No discharge.      Conjunctiva/sclera: Conjunctivae normal.      Pupils: Pupils are equal, round, and reactive to light.   Neck:      Thyroid: No thyromegaly.      Vascular: No JVD.      Trachea: No tracheal deviation.   Cardiovascular:      Rate and Rhythm: Normal rate and regular rhythm.      Heart sounds: Normal heart sounds. No murmur heard.     No friction rub. No gallop.   Pulmonary:      Effort: Pulmonary effort is normal. No respiratory distress.      Breath sounds: Normal breath sounds. No stridor. No wheezing or rales.   Chest:      Chest wall: No tenderness.   Abdominal:      General: Bowel sounds are normal. There is no distension.      Palpations: Abdomen is soft. There is no mass.      Tenderness: There is no abdominal tenderness. There is no guarding or rebound.      Hernia: No hernia is present.   Genitourinary:     Rectum: Guaiac result negative.   Musculoskeletal:      Cervical back: Normal range of motion and neck supple.   Lymphadenopathy:      Cervical: No cervical adenopathy.   Skin:     General: Skin is warm and dry.      Coloration: Skin is not pale.      Findings: No erythema or rash.   Neurological:      Mental Status: He is alert and oriented to person, place, and time.      Cranial Nerves: No cranial nerve deficit.      Motor: No " abnormal muscle tone.      Coordination: Coordination normal.      Deep Tendon Reflexes: Reflexes are normal and symmetric. Reflexes normal.   Psychiatric:         Behavior: Behavior normal.         Thought Content: Thought content normal.         Judgment: Judgment normal.         Assessment & Plan      Diagnosis Plan   1. Chronic hepatitis C without hepatic coma  Ambulatory Referral to Gastroenterology        Patient will have EGD by General Surgery to rule out esophageal varices in setting of new diagnosis of cirrhosis of the liver.  He will return after EGD and if his liver cirrhosis is deemed as compensated, we will begin tx for Hepatitis C.    He will be referred to [facility] for management of cirrhosis of the liver.  Return in about 3 weeks (around 9/25/2024) for Recheck.         Victorino Lee PA-C

## 2024-09-04 NOTE — TELEPHONE ENCOUNTER
----- Message from Cherrie Le sent at 9/4/2024  1:36 PM EDT -----  Please let patient know that his recent ultrasound liver was normal.  
Left message letting patient know information.   
Airway patent, Nasal mucosa clear. Mouth with normal mucosa. Throat has no vesicles, no oropharyngeal exudates and uvula is midline.

## 2024-09-04 NOTE — PROGRESS NOTES
Subjective   Jayden Cotto is a 58 y.o. male who presents today for Initial Evaluation    Chief Complaint:    Chief Complaint   Patient presents with    EGD        History of Present Illness:    History of Present Illness Jayden is a 58-year-old male who presents for evaluation for an EGD.  He is currently being evaluated by the hepatitis clinic care at our facility.  He denies any history of esophageal varices in the past.  Denies any history of IV drug use.  Does report that he has had some issues with allergies in the recent past.  He has never had an EGD in the past.  He does currently take Ozempic.  He takes his injection on Mondays.,  He did have it this past Monday.    The following portions of the patient's history were reviewed and updated as appropriate: allergies, current medications, past family history, past medical history, past social history, past surgical history and problem list.    Past Medical History:  Past Medical History:   Diagnosis Date    Diabetes mellitus        Social History:  Social History     Socioeconomic History    Marital status: Single   Tobacco Use    Smoking status: Every Day     Current packs/day: 0.25     Types: Cigarettes     Passive exposure: Current    Smokeless tobacco: Never   Vaping Use    Vaping status: Every Day    Substances: Nicotine   Substance and Sexual Activity    Alcohol use: Not Currently     Comment: Occasionally    Drug use: Never    Sexual activity: Not Currently       Family History:  History reviewed. No pertinent family history.    Past Surgical History:  Past Surgical History:   Procedure Laterality Date    EXTERNAL EAR SURGERY Right     HAND SURGERY Right        Problem List:  Patient Active Problem List   Diagnosis    Balanitis       Allergy:   Allergies   Allergen Reactions    Motrin [Ibuprofen] Shortness Of Breath        Current Medications:   Current Outpatient Medications   Medication Sig Dispense Refill    amitriptyline (ELAVIL) 10 MG tablet 1 tablet.  "     clotrimazole-betamethasone (Lotrisone) 1-0.05 % cream Apply 1 application topically to the appropriate area as directed 2 (Two) Times a Day. 45 g 2    DULoxetine (CYMBALTA) 30 MG capsule 1 capsule.      gabapentin (NEURONTIN) 400 MG capsule Take 1 capsule by mouth 3 (Three) Times a Day.      HYDROcodone-acetaminophen (NORCO) 7.5-325 MG per tablet 1 tablet.      Jardiance 25 MG tablet tablet 1 tablet.      loratadine (CLARITIN) 10 MG tablet Take 1 tablet by mouth Daily.      metFORMIN (GLUCOPHAGE) 1000 MG tablet 1 tablet.      naloxone (NARCAN) 4 MG/0.1ML nasal spray 1 spray.      Ozempic, 0.25 or 0.5 MG/DOSE, 2 MG/3ML solution pen-injector 0.25 mg.      Victoza 18 MG/3ML solution pen-injector injection 0.6 mg.      vitamin D (ERGOCALCIFEROL) 1.25 MG (74898 UT) capsule capsule 1 capsule.       No current facility-administered medications for this visit.       Review of Systems:    Review of Systems   Gastrointestinal:  Negative for vomiting.         Physical Exam:   Physical Exam  Constitutional:       Appearance: Normal appearance.   HENT:      Head: Normocephalic and atraumatic.      Right Ear: External ear normal.      Left Ear: External ear normal.   Eyes:      Conjunctiva/sclera: Conjunctivae normal.   Abdominal:      General: Abdomen is flat.   Musculoskeletal:         General: Normal range of motion.      Cervical back: Normal range of motion and neck supple.   Skin:     General: Skin is warm and dry.      Capillary Refill: Capillary refill takes less than 2 seconds.   Neurological:      General: No focal deficit present.      Mental Status: He is alert and oriented to person, place, and time.   Psychiatric:         Mood and Affect: Mood normal.         Behavior: Behavior normal.         Vitals:  Blood pressure 130/72, height 182.9 cm (72.01\"), weight 116 kg (255 lb).   Body mass index is 34.58 kg/m².        Assessment & Plan   Diagnoses and all orders for this visit:    1. Hepatitis C virus infection " without hepatic coma, unspecified chronicity (Primary)  -     Case Request; Standing  -     Case Request    Other orders  -     Follow Anesthesia Guidelines / Protocol; Future  -     Follow Anesthesia Guidelines / Protocol; Standing  -     Verify / Perform Chlorhexidine Skin Prep; Standing  -     Obtain Informed Consent; Future  -     Provide NPO Instructions to Patient; Future  -     Chlorhexidine Skin Prep; Future  -     Place Sequential Compression Device; Standing  -     Maintain Sequential Compression Device; Standing    Jayden is a 58-year-old male presents for evaluation for an EGD.  He undergo EGD with Dr. Patrick.  Verbalized understanding of prep instructions and procedure wished to proceed.    Visit Diagnoses:    ICD-10-CM ICD-9-CM   1. Hepatitis C virus infection without hepatic coma, unspecified chronicity  B19.20 070.70         MEDS ORDERED DURING VISIT:  No orders of the defined types were placed in this encounter.      Return for Follow-up postop.             This document has been electronically signed by MISSY Alexis  September 4, 2024 11:51 EDT    Please note that portions of this note were completed with a voice recognition program.

## 2024-09-06 PROBLEM — B19.20 HEPATITIS C VIRUS INFECTION WITHOUT HEPATIC COMA: Status: ACTIVE | Noted: 2024-09-04

## 2024-09-19 ENCOUNTER — ANESTHESIA EVENT (OUTPATIENT)
Dept: PERIOP | Facility: HOSPITAL | Age: 59
End: 2024-09-19
Payer: MEDICARE

## 2024-09-19 ENCOUNTER — ANESTHESIA (OUTPATIENT)
Dept: PERIOP | Facility: HOSPITAL | Age: 59
End: 2024-09-19
Payer: MEDICARE

## 2024-09-19 ENCOUNTER — HOSPITAL ENCOUNTER (OUTPATIENT)
Facility: HOSPITAL | Age: 59
Setting detail: HOSPITAL OUTPATIENT SURGERY
Discharge: HOME | End: 2024-09-19
Attending: SURGERY | Admitting: SURGERY
Payer: MEDICARE

## 2024-09-19 VITALS
HEIGHT: 72 IN | DIASTOLIC BLOOD PRESSURE: 80 MMHG | TEMPERATURE: 99.4 F | WEIGHT: 253 LBS | SYSTOLIC BLOOD PRESSURE: 116 MMHG | BODY MASS INDEX: 34.27 KG/M2 | HEART RATE: 69 BPM | RESPIRATION RATE: 18 BRPM | OXYGEN SATURATION: 96 %

## 2024-09-19 DIAGNOSIS — B19.20 HEPATITIS C VIRUS INFECTION WITHOUT HEPATIC COMA, UNSPECIFIED CHRONICITY: ICD-10-CM

## 2024-09-19 LAB — GLUCOSE BLDC GLUCOMTR-MCNC: 151 MG/DL (ref 70–130)

## 2024-09-19 PROCEDURE — 25810000003 LACTATED RINGERS PER 1000 ML: Performed by: ANESTHESIOLOGY

## 2024-09-19 PROCEDURE — 82948 REAGENT STRIP/BLOOD GLUCOSE: CPT

## 2024-09-19 PROCEDURE — 25010000002 PROPOFOL 200 MG/20ML EMULSION: Performed by: NURSE ANESTHETIST, CERTIFIED REGISTERED

## 2024-09-19 RX ORDER — PANTOPRAZOLE SODIUM 40 MG/1
40 TABLET, DELAYED RELEASE ORAL DAILY
Qty: 30 TABLET | Refills: 1 | Status: SHIPPED | OUTPATIENT
Start: 2024-09-19 | End: 2025-09-19

## 2024-09-19 RX ORDER — IPRATROPIUM BROMIDE AND ALBUTEROL SULFATE 2.5; .5 MG/3ML; MG/3ML
3 SOLUTION RESPIRATORY (INHALATION) ONCE AS NEEDED
Status: DISCONTINUED | OUTPATIENT
Start: 2024-09-19 | End: 2024-09-19 | Stop reason: HOSPADM

## 2024-09-19 RX ORDER — SODIUM CHLORIDE 9 MG/ML
40 INJECTION, SOLUTION INTRAVENOUS AS NEEDED
Status: DISCONTINUED | OUTPATIENT
Start: 2024-09-19 | End: 2024-09-19 | Stop reason: HOSPADM

## 2024-09-19 RX ORDER — ONDANSETRON 2 MG/ML
4 INJECTION INTRAMUSCULAR; INTRAVENOUS AS NEEDED
Status: DISCONTINUED | OUTPATIENT
Start: 2024-09-19 | End: 2024-09-19 | Stop reason: HOSPADM

## 2024-09-19 RX ORDER — SODIUM CHLORIDE 0.9 % (FLUSH) 0.9 %
10 SYRINGE (ML) INJECTION AS NEEDED
Status: DISCONTINUED | OUTPATIENT
Start: 2024-09-19 | End: 2024-09-19 | Stop reason: HOSPADM

## 2024-09-19 RX ORDER — FENTANYL CITRATE 50 UG/ML
50 INJECTION, SOLUTION INTRAMUSCULAR; INTRAVENOUS
Status: DISCONTINUED | OUTPATIENT
Start: 2024-09-19 | End: 2024-09-19 | Stop reason: HOSPADM

## 2024-09-19 RX ORDER — MEPERIDINE HYDROCHLORIDE 25 MG/ML
12.5 INJECTION INTRAMUSCULAR; INTRAVENOUS; SUBCUTANEOUS
Status: DISCONTINUED | OUTPATIENT
Start: 2024-09-19 | End: 2024-09-19 | Stop reason: HOSPADM

## 2024-09-19 RX ORDER — MIDAZOLAM HYDROCHLORIDE 1 MG/ML
1 INJECTION INTRAMUSCULAR; INTRAVENOUS
Status: DISCONTINUED | OUTPATIENT
Start: 2024-09-19 | End: 2024-09-19 | Stop reason: HOSPADM

## 2024-09-19 RX ORDER — SODIUM CHLORIDE, SODIUM LACTATE, POTASSIUM CHLORIDE, CALCIUM CHLORIDE 600; 310; 30; 20 MG/100ML; MG/100ML; MG/100ML; MG/100ML
100 INJECTION, SOLUTION INTRAVENOUS ONCE AS NEEDED
Status: DISCONTINUED | OUTPATIENT
Start: 2024-09-19 | End: 2024-09-19 | Stop reason: HOSPADM

## 2024-09-19 RX ORDER — PROPOFOL 10 MG/ML
INJECTION, EMULSION INTRAVENOUS AS NEEDED
Status: DISCONTINUED | OUTPATIENT
Start: 2024-09-19 | End: 2024-09-19 | Stop reason: SURG

## 2024-09-19 RX ORDER — SODIUM CHLORIDE, SODIUM LACTATE, POTASSIUM CHLORIDE, CALCIUM CHLORIDE 600; 310; 30; 20 MG/100ML; MG/100ML; MG/100ML; MG/100ML
125 INJECTION, SOLUTION INTRAVENOUS ONCE
Status: COMPLETED | OUTPATIENT
Start: 2024-09-19 | End: 2024-09-19

## 2024-09-19 RX ORDER — LIDOCAINE HYDROCHLORIDE 20 MG/ML
INJECTION, SOLUTION EPIDURAL; INFILTRATION; INTRACAUDAL; PERINEURAL AS NEEDED
Status: DISCONTINUED | OUTPATIENT
Start: 2024-09-19 | End: 2024-09-19 | Stop reason: SURG

## 2024-09-19 RX ORDER — OXYCODONE AND ACETAMINOPHEN 5; 325 MG/1; MG/1
1 TABLET ORAL ONCE AS NEEDED
Status: DISCONTINUED | OUTPATIENT
Start: 2024-09-19 | End: 2024-09-19 | Stop reason: HOSPADM

## 2024-09-19 RX ORDER — SODIUM CHLORIDE 0.9 % (FLUSH) 0.9 %
10 SYRINGE (ML) INJECTION EVERY 12 HOURS SCHEDULED
Status: DISCONTINUED | OUTPATIENT
Start: 2024-09-19 | End: 2024-09-19 | Stop reason: HOSPADM

## 2024-09-19 RX ADMIN — SODIUM CHLORIDE, POTASSIUM CHLORIDE, SODIUM LACTATE AND CALCIUM CHLORIDE: 600; 310; 30; 20 INJECTION, SOLUTION INTRAVENOUS at 10:37

## 2024-09-19 RX ADMIN — LIDOCAINE HYDROCHLORIDE 100 MG: 20 INJECTION, SOLUTION EPIDURAL; INFILTRATION; INTRACAUDAL; PERINEURAL at 10:37

## 2024-09-19 RX ADMIN — PROPOFOL 100 MG: 10 INJECTION, EMULSION INTRAVENOUS at 10:40

## 2024-09-21 LAB — REF LAB TEST METHOD: NORMAL

## 2024-10-09 ENCOUNTER — SPECIALTY PHARMACY (OUTPATIENT)
Dept: PHARMACY | Facility: HOSPITAL | Age: 59
End: 2024-10-09
Payer: MEDICARE

## 2024-10-09 VITALS
WEIGHT: 255 LBS | OXYGEN SATURATION: 97 % | RESPIRATION RATE: 18 BRPM | BODY MASS INDEX: 34.54 KG/M2 | HEIGHT: 72 IN | HEART RATE: 72 BPM | SYSTOLIC BLOOD PRESSURE: 161 MMHG | DIASTOLIC BLOOD PRESSURE: 76 MMHG

## 2024-10-09 DIAGNOSIS — B18.2 CHRONIC HEPATITIS C WITHOUT HEPATIC COMA: Primary | ICD-10-CM

## 2024-10-09 RX ORDER — VELPATASVIR AND SOFOSBUVIR 100; 400 MG/1; MG/1
1 TABLET, FILM COATED ORAL DAILY
Start: 2024-10-09 | End: 2024-10-10 | Stop reason: SDUPTHER

## 2024-10-09 RX ORDER — GLECAPREVIR AND PIBRENTASVIR 40; 100 MG/1; MG/1
3 TABLET, FILM COATED ORAL DAILY
Start: 2024-10-09 | End: 2024-10-09 | Stop reason: SDUPTHER

## 2024-10-09 RX ORDER — VELPATASVIR AND SOFOSBUVIR 100; 400 MG/1; MG/1
1 TABLET, FILM COATED ORAL DAILY
Status: CANCELLED
Start: 2024-10-09

## 2024-10-09 RX ORDER — GLECAPREVIR AND PIBRENTASVIR 40; 100 MG/1; MG/1
3 TABLET, FILM COATED ORAL DAILY
Qty: 84 TABLET | Refills: 1 | Status: SHIPPED | OUTPATIENT
Start: 2024-10-09 | End: 2024-10-09

## 2024-10-09 NOTE — PROGRESS NOTES
Medication Management Clinic  Hepatitis C Clinical Assessment     Jayden Cotto is a 58 y.o. male was referred by their provider to the Hepatitis Clinic Patient Management program offered by [facility] for Hepatitis C Management on 10/09/24.  An initial outreach was conducted, including assessment of therapy appropriateness and specialty medication education for Epclusa. The patient was introduced to services offered by [facility], including: regular assessments, refill coordination, curbside pick-up or mail order delivery options, prior authorization maintenance, and financial assistance programs as applicable. The patient was also provided with contact information for the pharmacy team.     Insurance Coverage & Financial Support  [facility]      Relevant Past Medical History and Comorbidities  Relevant medical history and concomitant health conditions were discussed with the patient. The patient's chart has been reviewed for relevant past medical history and comorbid conditions and updated as necessary.  Past Medical History:   Diagnosis Date    Arthritis     Diabetes mellitus     Hyperlipidemia      Social History     Socioeconomic History    Marital status: Single   Tobacco Use    Smoking status: Every Day     Current packs/day: 0.25     Types: Cigarettes     Passive exposure: Current    Smokeless tobacco: Never   Vaping Use    Vaping status: Every Day    Substances: Nicotine   Substance and Sexual Activity    Alcohol use: Not Currently     Comment: Occasionally    Drug use: Never    Sexual activity: Defer       Problem list reviewed by Dot Leiva RP on 10/9/2024 at  3:19 PM    Allergies  Known allergies and reactions were discussed with the patient. The patient's chart has been reviewed for  allergy information and updated as necessary.   Allergies   Allergen Reactions    Motrin [Ibuprofen] Shortness Of Breath       Allergies reviewed by Cali  "FEDERICA Chris on 10/9/2024 at  3:19 PM    Relevant Laboratory Values  Relevant laboratory values were discussed with the patient.   Lab Results   Component Value Date    GLUCOSE 190 (H) 07/08/2024    CALCIUM 9.6 07/08/2024     07/08/2024    K 4.3 07/08/2024    CO2 21.0 (L) 07/08/2024     07/08/2024    BUN 5 (L) 07/08/2024    CREATININE 0.76 07/08/2024    BCR 6.6 (L) 07/08/2024    ANIONGAP 13.0 07/08/2024     (H) 07/08/2024     (H) 07/08/2024     Lab Results   Component Value Date    CHLPL 132 02/13/2015    TRIG 155 (H) 07/08/2024    HDL 34 (L) 02/13/2015    LDL 79 02/13/2015     Lab Results   Component Value Date    WBC 5.08 07/12/2024    HGB 15.2 07/12/2024    HCT 44.1 07/12/2024    MCV 93.2 07/12/2024    PLT 94 (L) 07/12/2024     No results found for: \"HCVRNA\"  161,000  Hepatitis C Genotype   Date Value Ref Range Status   07/12/2024 1a  Final     HCV Genotype   Date Value Ref Range Status   07/12/2024 Comment  Final     Comment:     To be performed on this specimen.        Current Medication List  This medication list has been reviewed with the patient and evaluated for any interactions or necessary modifications/recommendations, and updated to include all prescription medications, OTC medications, and supplements the patient is currently taking.  This list reflects what is contained in the patient's profile, which has also been marked as reviewed to communicate to other providers it is the most up to date version of the patient's current medication therapy.     Current Outpatient Medications:     clotrimazole-betamethasone (Lotrisone) 1-0.05 % cream, Apply 1 application topically to the appropriate area as directed 2 (Two) Times a Day., Disp: 45 g, Rfl: 2    gabapentin (NEURONTIN) 400 MG capsule, Take 1 capsule by mouth 3 (Three) Times a Day., Disp: , Rfl:     HYDROcodone-acetaminophen (NORCO) 7.5-325 MG per tablet, 1 tablet., Disp: , Rfl:     loratadine (CLARITIN) 10 MG tablet, Take 1 " tablet by mouth Daily., Disp: , Rfl:     metFORMIN (GLUCOPHAGE) 1000 MG tablet, 1 tablet., Disp: , Rfl:     Ozempic, 0.25 or 0.5 MG/DOSE, 2 MG/3ML solution pen-injector, 0.25 mg., Disp: , Rfl:     pantoprazole (Protonix) 40 MG EC tablet, Take 1 tablet by mouth Daily., Disp: 30 tablet, Rfl: 1    vitamin D (ERGOCALCIFEROL) 1.25 MG (99525 UT) capsule capsule, 1 capsule., Disp: , Rfl:     Medicines reviewed by Dot Leiva [facility] on 10/9/2024 at  3:19 PM    Drug Interactions  - Epclusa + Pantoprazole: Epclusa should be administered with food and taken 4 hours before proton pump inhibitor at max doses comparable to omeprazole 20 mg.    Previous Hep C Treatment  is treatment naïve    Fibrosis  F4  FIB4  6.6  Immunizations  Hep A antibody positive   Hepatitis B antibody non-reactive  Lab Results   Component Value Date    HAV Positive (A) 08/07/2024    HEPAIGM Non-Reactive 07/08/2024    HEPBCAB Negative 08/07/2024         Co-infection  HIV non-reactive   Hepatitis B antigen non-reactive     Goals of Therapy  Goals related to the patient's specialty therapy were discussed with the patient. The Patient Goals segment of this outreach has been reviewed and updated.   Goals Addressed Today        Specialty Pharmacy General Goal      SVR12            Adherence and Self-Administration  Adherence related to the patient's specialty therapy was discussed with the patient. The Adherence segment of this outreach has been reviewed and updated.   Is there a concern with patient's ability to self administer the medication correctly and without issue?: No  Were any potential barriers to adherence identified during the initial assessment or patient education?: No  Are there any concerns regarding the patient's understanding of the importance of medication adherence?: No  Methods for Supporting Patient Adherence and/or Self-Administration: discussed the use of pill planners or utilizing phone alarms/reminders  Open Medication Therapy  Problems  No medication therapy recommendations to display  Assessment Plan & Follow-Up  1. Patient has Hepatitis C, genotype 1a (F4)(FIB-4 =6.6) and is treatment naïve.  Patient has been prescribed Epclusa 1 tablet daily for 12 weeks. Will begin prior authorization, if applicable.   2. Related Plans, Therapy Recommendations, or Therapy Problems to Be Addressed: The patient's fibrosure is F4 and FIB-4 is 6.6. Provider is aware of this and decided to proceed with treatment.   3.The following immunizations are needed: hepatitis B.   4. Pharmacist to perform regular assessments no more than 4 weeks from the previous assessment.   5. Care Coordinator to set up future refill outreaches, coordinate prescription delivery, and escalate clinical questions to pharmacist.  6. Welcome information and patient satisfaction survey to be sent by specialty pharmacy team with patient's initial fill.  7. DDI: Epclusa + Pantoprazole: Epclusa should be administered with food and taken 4 hours before proton pump inhibitor at max doses comparable to omeprazole 20 mg. Counseled patient on the appropriate administration of Epclusa and Pantoprazole, patient voiced understanding.     Initial Education Provided for Specialty Medication  The patient has been provided with the following education and any applicable administration techniques (i.e. self-injection) have been demonstrated for the therapies indicated. All questions and concerns have been addressed prior to the patient receiving the medication, and the patient has verbalized comprehension of the education and any materials provided. Additional patient education shall be provided and documented upon request by the patient, provider, or payer.    Initial Education Provided for Epclusa    The patient has been provided with the following education for EPCLUSA. All questions and concerns have been addressed prior to the patient receiving the medication, and the patient has verbalized  understanding of the education and any materials provided.  Additional patient education shall be provided and documented upon request by the patient, provider or payer.      Patient was counseled on new medication Epclusa (sofosbuvir and velpatasvir)     - This medication is used to treat hepatitis C infection and the goal of this treatment is to cure Hepatitis C.   - Take 1 tablet by mouth at the same time each day, with or without food.   - You will take this for a total of 12 weeks    - Frequently reported side effects of this drug include: headache, loss of energy, nausea and diarrhea.     - Go to the ED or call 911 with signs of a significant reaction including wheezing; chest tightness; fever; itching; bad cough; blue skin color; seizures; or swelling of face, lips, tongue or throat.   - Hepatic decompensation and hepatic failure have been reported. This typically occurs within the first 4 weeks of treatment initiation. Talk to your doctor right away if you notice dark urine, fatigue, lack of appetite, nausea, abdominal pain, light-colored stools, vomiting or yellow skin.       - Be sure to follow up with our clinic 4 weeks after starting the medication to ensure you are tolerating the medication well and that you are responding appropriately to the medication.   - Make sure to tell your doctor or pharmacist about all medications you are taking, including herbal supplements and OTC products.    - Do not stop taking this medication without talking to your provider.     - It is very important that you do not miss a dose of this medication.  Use a pill planner, medication adherence natalia or other tool to help you remember how to take your medication.    - If you do miss a dose, take the missed dose the same day as soon as you remember and your next dose at the regular time the next day. Do not take more than 1 tablet in a day.     - Keep this medication away from extreme temperatures or moisture exposure. Store at  room temperature.     Patient Specific Counseling Points:     - Females of childbearing potential should consider postponing pregnancy until therapy is complete to reduce the risk of HCV transmission.   - This medication should not be used in pregnant females and it is not known if the medication is present in breast milk.     - Patients with diabetes should closely monitor their blood sugar after starting. This medication may lead to an improvement in glucose metabolism, potentially resulting in hypoglycemia.  Monitor for signs and symptoms of hypoglycemia and follow the rule of 15 to treat hypoglycemia.       Adherence and Self-Administration  Barriers to Patient Adherence and/or Self-Administration: None  Methods for Supporting Patient Adherence and/or Self-Administration: None Required     Associated Vaccinations     Your chronic liver disease puts you at risk for serious complications if you get infected with hepatitis A virus.  If you've never been vaccinated against hepatitis A, you need 2 doses of this vaccine, usually spaced 6-19 months apart.     If you already have chronic hepatitis B infection, you won't need a hepatitis B vaccine.  However, if you do not have sufficient Hepatitis B antibodies (either not vaccinated or insufficient response to vaccination), you should get the Hepatitis B vaccination series.  The vaccine is given in 2 or 3 doses, depending on the brand.     A combination A & B vaccination is also available if both are needed.     Contraindications: Severe allergic reaction (eg, anaphylaxis) after a previous dose of any hepatitis A-containing or hepatitis B-containing vaccine or any component of the formulation, including yeast and neomycin.   Precautions: Consider deferring administration in patients with moderate or severe acute illness.  Use with caution in patients with bleeding disorders or severely immunocompromised patients    Jayden Cotto was counseled that the following  immunizations are recommended: hepatitis B.     The patient would like to  in our pharmacy.      Attestation  Therapeutic appropriateness: Appropriate   I attest the patient was actively involved in and has agreed to the above plan of care. If the prescribed therapy is at any point deemed not appropriate based on the current or future assessments, a consultation will be initiated with the patient's specialty care provider to determine the best course of action. The revised plan of therapy will be documented along with any required assessments and/or additional patient education provided.     Dot Leiva RPH  10/9/2024  16:03 EDT

## 2024-10-09 NOTE — PROGRESS NOTES
"Chief Complaint   Patient presents with    Follow-up     HEP C        Jayden Cotto is a 58 y.o. male who presents to the office today for follow up appointment for Follow-up (HEP C )  .    HPI    The patient was seen for a follow up visit for Hep C treatment. He had EGD to make sure his cirrhosis of the liver is compensated.   EGD revealed no esophageal varices.      See lab results from previous visit below.     Relevant Laboratory Values   Lab Results   Component Value Date    GLUCOSE 190 (H) 07/08/2024    CALCIUM 9.6 07/08/2024     07/08/2024    K 4.3 07/08/2024    CO2 21.0 (L) 07/08/2024     07/08/2024    BUN 5 (L) 07/08/2024    CREATININE 0.76 07/08/2024    BCR 6.6 (L) 07/08/2024    ANIONGAP 13.0 07/08/2024     (H) 07/08/2024     (H) 07/08/2024      Lab Results   Component Value Date    WBC 5.08 07/12/2024    HGB 15.2 07/12/2024    HCT 44.1 07/12/2024    MCV 93.2 07/12/2024    PLT 94 (L) 07/12/2024    No results found for: \"HCVRNA\"  161,000  Hepatitis C Genotype   Date Value Ref Range Status   07/12/2024 1a  Final     HCV Genotype   Date Value Ref Range Status   07/12/2024 Comment  Final     Comment:     To be performed on this specimen.      Lab Results   Component Value Date    HAV Positive (A) 08/07/2024    HEPAIGM Non-Reactive 07/08/2024    HEPBCAB Negative 08/07/2024      Hep B surface antibody nonreactive  Hep B surface antigen  negative;   HIV nonreactive  PT-INR elevated  HCV fibrosure F4;  AFP tumor marker normal          Review of Systems   Constitutional:  Negative for activity change, appetite change and fatigue.   HENT:  Negative for trouble swallowing and voice change.    Respiratory:  Negative for cough and choking.    Cardiovascular:  Negative for leg swelling.   Gastrointestinal:  Negative for abdominal distention, abdominal pain, anal bleeding, blood in stool, constipation, diarrhea, nausea, rectal pain and vomiting.   Endocrine: Negative for polyphagia. "   Genitourinary:  Negative for flank pain.   Musculoskeletal:  Negative for back pain.   Skin:  Negative for color change and pallor.   Allergic/Immunologic: Negative for food allergies.   Neurological:  Negative for weakness.   Psychiatric/Behavioral:  Negative for confusion.        ACTIVE PROBLEMS:   Specialty Problems    None      PAST MEDICAL HISTORY:  Past Medical History:   Diagnosis Date    Arthritis     Diabetes mellitus     Hyperlipidemia        SURGICAL HISTORY:  Past Surgical History:   Procedure Laterality Date    ENDOSCOPY N/A 9/19/2024    Procedure: ESOPHAGOGASTRODUODENOSCOPY WITH ANESTHESIA;  Surgeon: Jimmy Patrick MD;  Location: [facility];  Service: Gastroenterology;  Laterality: N/A;    EXTERNAL EAR SURGERY Right     HAND SURGERY Right        FAMILY HISTORY:  History reviewed. No pertinent family history.    SOCIAL HISTORY:  Social History     Tobacco Use    Smoking status: Every Day     Current packs/day: 0.25     Types: Cigarettes     Passive exposure: Current    Smokeless tobacco: Never   Substance Use Topics    Alcohol use: Not Currently     Comment: Occasionally       CURRENT MEDICATION:    Current Outpatient Medications:     clotrimazole-betamethasone (Lotrisone) 1-0.05 % cream, Apply 1 application topically to the appropriate area as directed 2 (Two) Times a Day., Disp: 45 g, Rfl: 2    gabapentin (NEURONTIN) 400 MG capsule, Take 1 capsule by mouth 3 (Three) Times a Day., Disp: , Rfl:     HYDROcodone-acetaminophen (NORCO) 7.5-325 MG per tablet, 1 tablet., Disp: , Rfl:     loratadine (CLARITIN) 10 MG tablet, Take 1 tablet by mouth Daily., Disp: , Rfl:     metFORMIN (GLUCOPHAGE) 1000 MG tablet, 1 tablet., Disp: , Rfl:     Ozempic, 0.25 or 0.5 MG/DOSE, 2 MG/3ML solution pen-injector, 0.25 mg., Disp: , Rfl:     pantoprazole (Protonix) 40 MG EC tablet, Take 1 tablet by mouth Daily., Disp: 30 tablet, Rfl: 1    vitamin D (ERGOCALCIFEROL) 1.25 MG (97832 UT) capsule capsule, 1 capsule., Disp: ,  "Rfl:     Sofosbuvir-Velpatasvir (Epclusa) 400-100 MG tablet, Take 1 tablet by mouth Daily., Disp: , Rfl:     ALLERGIES:  Motrin [ibuprofen]    VISIT VITALS:  Blood Pressure 161/76 (BP Location: Left arm, Patient Position: Sitting, Cuff Size: Adult)   Pulse 72   Respiration 18   Height 182.9 cm (72\")   Weight 116 kg (255 lb)   Oxygen Saturation 97%   Body Mass Index 34.58 kg/m²     Physical Exam  Constitutional:       General: He is not in acute distress.     Appearance: He is well-developed. He is not diaphoretic.   HENT:      Head: Normocephalic and atraumatic.      Right Ear: External ear normal.      Left Ear: External ear normal.      Nose: Nose normal.      Mouth/Throat:      Pharynx: No oropharyngeal exudate.   Eyes:      General: No scleral icterus.        Right eye: No discharge.         Left eye: No discharge.      Conjunctiva/sclera: Conjunctivae normal.      Pupils: Pupils are equal, round, and reactive to light.   Neck:      Thyroid: No thyromegaly.      Vascular: No JVD.      Trachea: No tracheal deviation.   Cardiovascular:      Rate and Rhythm: Normal rate and regular rhythm.      Heart sounds: Normal heart sounds. No murmur heard.     No friction rub. No gallop.   Pulmonary:      Effort: Pulmonary effort is normal. No respiratory distress.      Breath sounds: Normal breath sounds. No stridor. No wheezing or rales.   Chest:      Chest wall: No tenderness.   Abdominal:      General: Bowel sounds are normal. There is no distension.      Palpations: Abdomen is soft. There is no mass.      Tenderness: There is no abdominal tenderness. There is no guarding or rebound.      Hernia: No hernia is present.   Genitourinary:     Rectum: Guaiac result negative.   Musculoskeletal:      Cervical back: Normal range of motion and neck supple.   Lymphadenopathy:      Cervical: No cervical adenopathy.   Skin:     General: Skin is warm and dry.      Coloration: Skin is not pale.      Findings: No erythema or rash. "   Neurological:      Mental Status: He is alert and oriented to person, place, and time.      Cranial Nerves: No cranial nerve deficit.      Motor: No abnormal muscle tone.      Coordination: Coordination normal.      Deep Tendon Reflexes: Reflexes are normal and symmetric. Reflexes normal.   Psychiatric:         Behavior: Behavior normal.         Thought Content: Thought content normal.         Judgment: Judgment normal.         Assessment & Plan      Diagnosis Plan   1. Chronic hepatitis C without hepatic coma        Epclusa will be ordered.  Patient was educated on administration and side effects on medication.  Patient will return in four weeks to check viral load to see if the body is responding effectively to treatment and also to have liver enzymes monitored.  He was also educated to take Eplcusa 4 hours before PPI.  Patient voiced understanding and agreement.     Return in about 4 weeks (around 11/6/2024) for Recheck.         Victorino Lee PA-C

## 2024-10-10 RX ORDER — VELPATASVIR AND SOFOSBUVIR 100; 400 MG/1; MG/1
1 TABLET, FILM COATED ORAL DAILY
Qty: 28 TABLET | Refills: 2 | Status: SHIPPED | OUTPATIENT
Start: 2024-10-10

## 2024-11-06 ENCOUNTER — SPECIALTY PHARMACY (OUTPATIENT)
Dept: INFECTIOUS DISEASES | Facility: CLINIC | Age: 59
End: 2024-11-06
Payer: MEDICARE

## 2024-11-06 ENCOUNTER — LAB (OUTPATIENT)
Dept: LAB | Facility: HOSPITAL | Age: 59
End: 2024-11-06
Payer: MEDICARE

## 2024-11-06 ENCOUNTER — SPECIALTY PHARMACY (OUTPATIENT)
Dept: PHARMACY | Facility: HOSPITAL | Age: 59
End: 2024-11-06
Payer: MEDICARE

## 2024-11-06 VITALS
DIASTOLIC BLOOD PRESSURE: 74 MMHG | BODY MASS INDEX: 34.81 KG/M2 | SYSTOLIC BLOOD PRESSURE: 161 MMHG | OXYGEN SATURATION: 98 % | WEIGHT: 257 LBS | HEART RATE: 74 BPM | HEIGHT: 72 IN

## 2024-11-06 DIAGNOSIS — B18.2 CHRONIC HEPATITIS C WITHOUT HEPATIC COMA: Primary | ICD-10-CM

## 2024-11-06 PROCEDURE — 36415 COLL VENOUS BLD VENIPUNCTURE: CPT

## 2024-11-06 PROCEDURE — 80053 COMPREHEN METABOLIC PANEL: CPT

## 2024-11-06 PROCEDURE — 87522 HEPATITIS C REVRS TRNSCRPJ: CPT

## 2024-11-06 RX ORDER — SEMAGLUTIDE 1.34 MG/ML
1 INJECTION, SOLUTION SUBCUTANEOUS WEEKLY
COMMUNITY
Start: 2024-09-25

## 2024-11-06 NOTE — PROGRESS NOTES
No chief complaint on file.      Jayden Cotto is a 58 y.o. male who presents to the office today for follow up appointment for No chief complaint on file.  .    HPI    Patient was seen for a follow up visit after being on Epclusa for one month.  He denied side effects and denied missing any doses.      Review of Systems   Constitutional:  Negative for activity change, appetite change and fatigue.   HENT:  Negative for trouble swallowing and voice change.    Respiratory:  Negative for cough and choking.    Cardiovascular:  Negative for leg swelling.   Gastrointestinal:  Negative for abdominal distention, abdominal pain, anal bleeding, blood in stool, constipation, diarrhea, nausea, rectal pain and vomiting.   Endocrine: Negative for polyphagia.   Genitourinary:  Negative for flank pain.   Musculoskeletal:  Negative for back pain.   Skin:  Negative for color change and pallor.   Allergic/Immunologic: Negative for food allergies.   Neurological:  Negative for weakness.   Psychiatric/Behavioral:  Negative for confusion.        ACTIVE PROBLEMS:   Specialty Problems    None      PAST MEDICAL HISTORY:  Past Medical History:   Diagnosis Date    Arthritis     Diabetes mellitus     Hyperlipidemia        SURGICAL HISTORY:  Past Surgical History:   Procedure Laterality Date    ENDOSCOPY N/A 9/19/2024    Procedure: ESOPHAGOGASTRODUODENOSCOPY WITH ANESTHESIA;  Surgeon: Jimmy Patrick MD;  Location: [facility];  Service: Gastroenterology;  Laterality: N/A;    EXTERNAL EAR SURGERY Right     HAND SURGERY Right        FAMILY HISTORY:  History reviewed. No pertinent family history.    SOCIAL HISTORY:  Social History     Tobacco Use    Smoking status: Every Day     Current packs/day: 0.25     Types: Cigarettes     Passive exposure: Current    Smokeless tobacco: Never   Substance Use Topics    Alcohol use: Not Currently     Comment: Occasionally       CURRENT MEDICATION:    Current Outpatient Medications:      "clotrimazole-betamethasone (Lotrisone) 1-0.05 % cream, Apply 1 application topically to the appropriate area as directed 2 (Two) Times a Day., Disp: 45 g, Rfl: 2    gabapentin (NEURONTIN) 400 MG capsule, Take 1 capsule by mouth 3 (Three) Times a Day., Disp: , Rfl:     HYDROcodone-acetaminophen (NORCO) 7.5-325 MG per tablet, 1 tablet., Disp: , Rfl:     loratadine (CLARITIN) 10 MG tablet, Take 1 tablet by mouth Daily., Disp: , Rfl:     metFORMIN (GLUCOPHAGE) 1000 MG tablet, 1 tablet., Disp: , Rfl:     Ozempic, 0.25 or 0.5 MG/DOSE, 2 MG/3ML solution pen-injector, 0.25 mg., Disp: , Rfl:     pantoprazole (Protonix) 40 MG EC tablet, Take 1 tablet by mouth Daily., Disp: 30 tablet, Rfl: 1    Sofosbuvir-Velpatasvir (Epclusa) 400-100 MG tablet, Take 1 tablet by mouth Daily., Disp: 28 tablet, Rfl: 2    vitamin D (ERGOCALCIFEROL) 1.25 MG (93886 UT) capsule capsule, 1 capsule., Disp: , Rfl:     ALLERGIES:  Motrin [ibuprofen]    VISIT VITALS:  /74 (BP Location: Right arm, Patient Position: Sitting, Cuff Size: Adult)   Pulse 74   Ht 182.9 cm (72\")   Wt 117 kg (257 lb)   SpO2 98%   BMI 34.86 kg/m²     Physical Exam  Constitutional:       General: He is not in acute distress.     Appearance: He is well-developed. He is obese. He is not diaphoretic.   HENT:      Head: Normocephalic and atraumatic.      Right Ear: External ear normal.      Left Ear: External ear normal.      Nose: Nose normal.      Mouth/Throat:      Pharynx: No oropharyngeal exudate.   Eyes:      General: No scleral icterus.        Right eye: No discharge.         Left eye: No discharge.      Conjunctiva/sclera: Conjunctivae normal.      Pupils: Pupils are equal, round, and reactive to light.   Neck:      Thyroid: No thyromegaly.      Vascular: No JVD.      Trachea: No tracheal deviation.   Cardiovascular:      Rate and Rhythm: Normal rate and regular rhythm.      Heart sounds: Normal heart sounds. No murmur heard.     No friction rub. No gallop. "   Pulmonary:      Effort: Pulmonary effort is normal. No respiratory distress.      Breath sounds: Normal breath sounds. No stridor. No wheezing or rales.   Chest:      Chest wall: No tenderness.   Abdominal:      General: Bowel sounds are normal. There is no distension.      Palpations: Abdomen is soft. There is no mass.      Tenderness: There is no abdominal tenderness. There is no guarding or rebound.      Hernia: No hernia is present.   Genitourinary:     Rectum: Guaiac result negative.   Musculoskeletal:      Cervical back: Normal range of motion and neck supple.   Lymphadenopathy:      Cervical: No cervical adenopathy.   Skin:     General: Skin is warm and dry.      Coloration: Skin is not pale.      Findings: No erythema or rash.   Neurological:      Mental Status: He is alert and oriented to person, place, and time.      Cranial Nerves: No cranial nerve deficit.      Motor: No abnormal muscle tone.      Coordination: Coordination normal.      Deep Tendon Reflexes: Reflexes are normal and symmetric. Reflexes normal.   Psychiatric:         Behavior: Behavior normal.         Thought Content: Thought content normal.         Judgment: Judgment normal.         Assessment & Plan      Diagnosis Plan   1. Chronic hepatitis C without hepatic coma  Hepatitis C RNA, Quantitative, PCR (graph)    Comprehensive Metabolic Panel        Patient will have liver enzymes and viral load of Hep C checked today.  He will follow up 12 weeks post treatment for final Hep C labs and visit.  Patient was educated on ways to control fibrosis.  He was also given a low sodium diet.  Return in about 22 weeks (around 4/9/2025).         Victorino Lee PA-C

## 2024-11-06 NOTE — PROGRESS NOTES
Specialty Pharmacy Patient Management Program  Hepatitis C Reassessment     Jayden Cotto is a 58 y.o. male seen in the [facility] Management [facility] for Hepatitis C and enrolled in the Patient Management program offered by [facility]. A follow-up outreach was conducted, including assessment of continued therapy appropriateness, medication adherence, and side effect incidence and management for Epclusa (sofosbuvir/velpatasvir). Patient denies/reports missing any doses, reports tolerating medication with no adverse effects/ reports adverse effects.      Changes to Insurance Coverage or Financial Support  none    Relevant Past Medical History and Comorbidities  Relevant medical history and concomitant health conditions were discussed with the patient. The patient's chart has been reviewed for relevant past medical history and comorbid health conditions and updated as necessary.   Past Medical History:   Diagnosis Date    Arthritis     Diabetes mellitus     Hyperlipidemia      Social History     Socioeconomic History    Marital status: Single   Tobacco Use    Smoking status: Every Day     Current packs/day: 0.25     Types: Cigarettes     Passive exposure: Current    Smokeless tobacco: Never   Vaping Use    Vaping status: Every Day    Substances: Nicotine   Substance and Sexual Activity    Alcohol use: Not Currently     Comment: Occasionally    Drug use: Never    Sexual activity: Defer     Problem list reviewed by Sonia Schwartz RPH on 11/6/2024 at  2:55 PM    Allergies  Known allergies and reactions were discussed with the patient. The patient's chart has been reviewed for allergy information and updated as necessary.   Motrin [ibuprofen] and Penicillins  Allergies reviewed by Sonia Schwartz RPH on 11/6/2024 at  2:51 PM    Current Medication List  This medication list has been reviewed with the patient and evaluated for any interactions or necessary modifications/recommendations, and updated to  include all prescription medications, OTC medications, and supplements the patient is currently taking. This list reflects what is contained in the patient's profile, which has also been marked as reviewed to communicate to other providers it is the most up to date version of the patient's current medication therapy.     Current Outpatient Medications:     clotrimazole-betamethasone (Lotrisone) 1-0.05 % cream, Apply 1 application topically to the appropriate area as directed 2 (Two) Times a Day. (Patient taking differently: Apply 1 Application topically to the appropriate area as directed 2 (Two) Times a Day As Needed.), Disp: 45 g, Rfl: 2    gabapentin (NEURONTIN) 400 MG capsule, Take 1 capsule by mouth 3 (Three) Times a Day. (Patient taking differently: Take 2 capsules by mouth 3 (Three) Times a Day.), Disp: , Rfl:     HYDROcodone-acetaminophen (NORCO) 7.5-325 MG per tablet, 1 tablet. (Patient taking differently: Take 1 tablet by mouth Every 6 (Six) Hours As Needed for Severe Pain.), Disp: , Rfl:     loratadine (CLARITIN) 10 MG tablet, Take 1 tablet by mouth Daily., Disp: , Rfl:     metFORMIN (GLUCOPHAGE) 1000 MG tablet, 1 tablet. (Patient taking differently: Take 1 tablet by mouth 2 (Two) Times a Day With Meals. Takes depending on sugar; if not high takes 1), Disp: , Rfl:     Ozempic, 1 MG/DOSE, 4 MG/3ML solution pen-injector, Inject 1 mg under the skin into the appropriate area as directed 1 (One) Time Per Week., Disp: , Rfl:     pantoprazole (Protonix) 40 MG EC tablet, Take 1 tablet by mouth Daily., Disp: 30 tablet, Rfl: 1    Sofosbuvir-Velpatasvir (Epclusa) 400-100 MG tablet, Take 1 tablet by mouth Daily., Disp: 28 tablet, Rfl: 2    vitamin D (ERGOCALCIFEROL) 1.25 MG (92179 UT) capsule capsule, Take 1 capsule by mouth Daily. Will switch to once a week, Disp: , Rfl:   Medicines reviewed by Sonia Schwartz [facility] on 11/6/2024 at  5:32 PM    Drug Interactions  Epclusa should be administered with food and taken 4  hours before proton pump inhibitor at max doses comparable to omeprazole 20 mg. Counseled patient on the appropriate administration of Epclusa and Pantoprazole, patient voiced understanding.      Evaluation  Prior hepatitis C treatment: none   Co-infection status  Hepatitis B: non-reactive   HIV: Negative   FIB-4: 6.6  Fibrosis score: F4  Score method: Fibrosure   Child-Moses classification if cirrhotic: Class A    Adverse Drug Reactions  Adverse Reactions Experienced: none  Plan for ADR Management: N/A     Hospitalizations and Urgent Care Since Last Assessment  Hospitalizations or Admissions: none  ED Visits: none  Urgent Office Visits: none     Relevant Laboratory Values  Lab Results   Component Value Date    HCVGENOTYPE Comment 07/12/2024    HCVGENOTYPE 1a 07/12/2024    HCVQUANT [id number] 07/12/2024     Fibrosis Score   Date/Time Value Ref Range Status   08/07/2024 12:10 PM 0.94 (H) 0.00 - 0.21 Final     Fibrosis Scoring:   Date/Time Value Ref Range Status   08/07/2024 12:10 PM Comment  Final     Comment:          <=0.21 = Stage F0 - No fibrosis  0.21 - 0.27 = Stage F0 - F1  0.27 - 0.31 = Stage F1 - Portal fibrosis  0.31 - 0.48 = Stage F1 - F2  0.48 - 0.58 = Stage F2 - Bridging fibrosis with few septa  0.58 - 0.72 = Stage F3 - Bridging fibrosis with many septa  0.72 - 0.74 = Stage F3 - F4        >0.74 = Stage F4 - Cirrhosis     Lab Results   Component Value Date    HAV Positive (A) 08/07/2024    HEPAIGM Non-Reactive 07/08/2024    HEPBCAB Negative 08/07/2024    HEPBIGMCORE Non-Reactive 07/08/2024    HEPBSAB Non-Reactive 08/07/2024    HEPBSAG Non-Reactive 08/07/2024    HEPCVIRUSABY Reactive (A) 07/08/2024    [id number] Non-Reactive 08/07/2024     Lab Results   Component Value Date    GLUCOSE 118 (H) 11/06/2024    BUN 6 11/06/2024    CREATININE 0.84 11/06/2024    BCR 7.1 11/06/2024     11/06/2024    K 4.3 11/06/2024     11/06/2024    CO2 27.0 11/06/2024    CALCIUM 9.5 11/06/2024    PROTEINTOT 8.0 11/06/2024     ALBUMIN 4.0 11/06/2024    ALT 30 11/06/2024    AST 37 11/06/2024    ALKPHOS 80 11/06/2024    BILITOT 0.8 11/06/2024    ANIONGAP 7.0 11/06/2024     Lab Results   Component Value Date    WBC 5.08 07/12/2024    HGB 15.2 07/12/2024    HCT 44.1 07/12/2024    PLT 94 (L) 07/12/2024    INR 1.39 (H) 07/08/2024     Lab Value Review  The above lab values have been reviewed; the following specialty medication(s) dose adjustment(s) are recommended: none.    Adherence and Self-Administration  Approximate Number of Doses Missed Since Last Assessment: none  Ongoing or New Barriers to Patient Adherence and/or Self-Administration: none   Methods for Supporting Patient Adherence and/or Self-Administration: N/A     Recently Close Medication Therapy Problems  No medication therapy recommendations to display    Goals of Therapy  Goals related to the patient's specialty therapy were discussed with the patient. The Patient Goals segment of this outreach has been reviewed and updated.   Goals Addressed Today        Specialty Pharmacy General Goal      SVR12              Quality of Life Assessment   Quality of Life related to the patient's specialty therapy was discussed with the patient. The QOL segment of this outreach has been reviewed and updated.   Quality of Life Assessment  Quality of Life Improvement Scale: 8-Moderately better    Reassessment Plan & Follow-Up  Hepatitis C Therapy Plan: Continue with current treatment of Epclusa: Take 1 tablet by mouth daily  Other Medication Therapy Changes: none  Additional Plans, Therapy Recommendations, or Therapy Problems to Be Addressed:   The patient's fibrosure is F4 and FIB-4 is 6.6. Provider is aware of this and decided to proceed with treatment.     DDI: Epclusa + Pantoprazole: Epclusa should be administered with food and taken 4 hours before proton pump inhibitor at max doses comparable to omeprazole 20 mg. Counseled patient on the appropriate administration of Epclusa and Pantoprazole,  patient voiced understanding.   Pharmacist to perform regular reassessments approximately every (4) weeks from the previous assessment until treatment completed.  Specialty Pharmacy team to set up future refill outreaches and coordinate prescription delivery. This is fill 2 out of 3. Patient is interested in shipping services for next fill     Attestation  Therapeutic appropriateness: Appropriate   I attest the patient was actively involved in and has agreed to the above plan of care. I attest that the specialty medication(s) addressed above are appropriate for the patient based on my reassessment. If the prescribed therapy is at any point deemed not appropriate based on the current or future assessments, a consultation will be initiated with the patient's specialty care provider to determine the best course of action. The revised plan of therapy will be documented along with any required assessments and/or additional patient education provided.    Thank you,      Sonia Schwartz RPH  11/07/24 10:17 EST

## 2024-11-07 LAB
ALBUMIN SERPL-MCNC: 4 G/DL (ref 3.5–5.2)
ALBUMIN/GLOB SERPL: 1 G/DL
ALP SERPL-CCNC: 80 U/L (ref 39–117)
ALT SERPL W P-5'-P-CCNC: 30 U/L (ref 1–41)
ANION GAP SERPL CALCULATED.3IONS-SCNC: 7 MMOL/L (ref 5–15)
AST SERPL-CCNC: 37 U/L (ref 1–40)
BILIRUB SERPL-MCNC: 0.8 MG/DL (ref 0–1.2)
BUN SERPL-MCNC: 6 MG/DL (ref 6–20)
BUN/CREAT SERPL: 7.1 (ref 7–25)
CALCIUM SPEC-SCNC: 9.5 MG/DL (ref 8.6–10.5)
CHLORIDE SERPL-SCNC: 102 MMOL/L (ref 98–107)
CO2 SERPL-SCNC: 27 MMOL/L (ref 22–29)
CREAT SERPL-MCNC: 0.84 MG/DL (ref 0.76–1.27)
EGFRCR SERPLBLD CKD-EPI 2021: 100.5 ML/MIN/1.73
GLOBULIN UR ELPH-MCNC: 4 GM/DL
GLUCOSE SERPL-MCNC: 118 MG/DL (ref 65–99)
POTASSIUM SERPL-SCNC: 4.3 MMOL/L (ref 3.5–5.2)
PROT SERPL-MCNC: 8 G/DL (ref 6–8.5)
SODIUM SERPL-SCNC: 136 MMOL/L (ref 136–145)

## 2024-11-09 LAB
HCV RNA SERPL NAA+PROBE-ACNC: <15 IU/ML
REF LAB TEST REF RANGE: NORMAL

## 2024-11-20 RX ORDER — PANTOPRAZOLE SODIUM 40 MG/1
40 TABLET, DELAYED RELEASE ORAL DAILY
Qty: 30 TABLET | Refills: 0 | Status: SHIPPED | OUTPATIENT
Start: 2024-11-20

## 2024-12-18 RX ORDER — PANTOPRAZOLE SODIUM 40 MG/1
40 TABLET, DELAYED RELEASE ORAL DAILY
Qty: 30 TABLET | Refills: 0 | Status: SHIPPED | OUTPATIENT
Start: 2024-12-18

## 2025-01-15 RX ORDER — PANTOPRAZOLE SODIUM 40 MG/1
40 TABLET, DELAYED RELEASE ORAL DAILY
Qty: 30 TABLET | Refills: 0 | Status: SHIPPED | OUTPATIENT
Start: 2025-01-15

## 2025-04-02 DIAGNOSIS — B18.2 CHRONIC HEPATITIS C WITHOUT HEPATIC COMA: Primary | ICD-10-CM

## 2025-04-07 ENCOUNTER — LAB (OUTPATIENT)
Dept: LAB | Facility: HOSPITAL | Age: 60
End: 2025-04-07
Payer: MEDICARE

## 2025-04-07 DIAGNOSIS — B18.2 CHRONIC HEPATITIS C WITHOUT HEPATIC COMA: ICD-10-CM

## 2025-04-07 PROCEDURE — 36415 COLL VENOUS BLD VENIPUNCTURE: CPT

## 2025-04-07 PROCEDURE — 87522 HEPATITIS C REVRS TRNSCRPJ: CPT

## 2025-04-07 PROCEDURE — 80053 COMPREHEN METABOLIC PANEL: CPT

## 2025-04-08 LAB
ALBUMIN SERPL-MCNC: 3.9 G/DL (ref 3.5–5.2)
ALBUMIN/GLOB SERPL: 1.1 G/DL
ALP SERPL-CCNC: 84 U/L (ref 39–117)
ALT SERPL W P-5'-P-CCNC: 29 U/L (ref 1–41)
ANION GAP SERPL CALCULATED.3IONS-SCNC: 7.3 MMOL/L (ref 5–15)
AST SERPL-CCNC: 38 U/L (ref 1–40)
BILIRUB SERPL-MCNC: 0.7 MG/DL (ref 0–1.2)
BUN SERPL-MCNC: 7 MG/DL (ref 8–23)
BUN/CREAT SERPL: 8.9 (ref 7–25)
CALCIUM SPEC-SCNC: 9.5 MG/DL (ref 8.6–10.5)
CHLORIDE SERPL-SCNC: 109 MMOL/L (ref 98–107)
CO2 SERPL-SCNC: 23.7 MMOL/L (ref 22–29)
CREAT SERPL-MCNC: 0.79 MG/DL (ref 0.76–1.27)
EGFRCR SERPLBLD CKD-EPI 2021: 101.7 ML/MIN/1.73
GLOBULIN UR ELPH-MCNC: 3.5 GM/DL
GLUCOSE SERPL-MCNC: 160 MG/DL (ref 65–99)
POTASSIUM SERPL-SCNC: 4.7 MMOL/L (ref 3.5–5.2)
PROT SERPL-MCNC: 7.4 G/DL (ref 6–8.5)
SODIUM SERPL-SCNC: 140 MMOL/L (ref 136–145)

## 2025-04-09 LAB
HCV RNA SERPL NAA+PROBE-ACNC: NORMAL IU/ML
REF LAB TEST REF RANGE: NORMAL

## 2025-04-16 ENCOUNTER — SPECIALTY PHARMACY (OUTPATIENT)
Dept: PHARMACY | Facility: HOSPITAL | Age: 60
End: 2025-04-16
Payer: MEDICARE

## 2025-04-16 VITALS
DIASTOLIC BLOOD PRESSURE: 61 MMHG | OXYGEN SATURATION: 97 % | SYSTOLIC BLOOD PRESSURE: 127 MMHG | HEART RATE: 72 BPM | WEIGHT: 257.8 LBS | HEIGHT: 72 IN | BODY MASS INDEX: 34.92 KG/M2

## 2025-04-16 DIAGNOSIS — B18.2 CHRONIC HEPATITIS C WITHOUT HEPATIC COMA: Primary | ICD-10-CM

## 2025-04-16 NOTE — PROGRESS NOTES
No chief complaint on file.      Jayden Cotto is a 59 y.o. male who presents to the office today for follow up appointment for No chief complaint on file.  .    HPI    Patient was seen for a followup visit.  Patient completed Epclusa treatment over 12 weeks ago.  HCV not detected on final labs.  Liver enzymes normal.       Review of Systems   Constitutional:  Negative for activity change, appetite change and fatigue.   HENT:  Negative for trouble swallowing and voice change.    Respiratory:  Negative for cough and choking.    Cardiovascular:  Negative for leg swelling.   Gastrointestinal:  Negative for abdominal distention, abdominal pain, anal bleeding, blood in stool, constipation, diarrhea, nausea, rectal pain and vomiting.   Endocrine: Negative for polyphagia.   Genitourinary:  Negative for flank pain.   Musculoskeletal:  Negative for back pain.   Skin:  Negative for color change and pallor.   Allergic/Immunologic: Negative for food allergies.   Neurological:  Negative for weakness.        Feet pain   Psychiatric/Behavioral:  Negative for confusion.        ACTIVE PROBLEMS:   Specialty Problems          Infectious Disease Problems    Hepatitis C virus infection without hepatic coma           PAST MEDICAL HISTORY:  Past Medical History:   Diagnosis Date    Arthritis     Diabetes mellitus     Hyperlipidemia        SURGICAL HISTORY:  Past Surgical History:   Procedure Laterality Date    ENDOSCOPY N/A 9/19/2024    Procedure: ESOPHAGOGASTRODUODENOSCOPY WITH ANESTHESIA;  Surgeon: Jimmy Partick MD;  Location: [facility];  Service: Gastroenterology;  Laterality: N/A;    EXTERNAL EAR SURGERY Right     HAND SURGERY Right        FAMILY HISTORY:  History reviewed. No pertinent family history.    SOCIAL HISTORY:  Social History     Tobacco Use    Smoking status: Every Day     Current packs/day: 0.25     Types: Cigarettes     Passive exposure: Current    Smokeless tobacco: Never   Substance Use Topics    Alcohol use: Not  "Currently     Comment: Occasionally       CURRENT MEDICATION:    Current Outpatient Medications:     clotrimazole-betamethasone (Lotrisone) 1-0.05 % cream, Apply 1 application topically to the appropriate area as directed 2 (Two) Times a Day. (Patient taking differently: Apply 1 Application topically to the appropriate area as directed 2 (Two) Times a Day As Needed.), Disp: 45 g, Rfl: 2    gabapentin (NEURONTIN) 400 MG capsule, Take 1 capsule by mouth 3 (Three) Times a Day. (Patient taking differently: Take 2 capsules by mouth 3 (Three) Times a Day.), Disp: , Rfl:     HYDROcodone-acetaminophen (NORCO) 7.5-325 MG per tablet, 1 tablet. (Patient taking differently: Take 1 tablet by mouth Every 6 (Six) Hours As Needed for Severe Pain.), Disp: , Rfl:     loratadine (CLARITIN) 10 MG tablet, Take 1 tablet by mouth Daily., Disp: , Rfl:     metFORMIN (GLUCOPHAGE) 1000 MG tablet, 1 tablet. (Patient taking differently: Take 1 tablet by mouth 2 (Two) Times a Day With Meals. Takes depending on sugar; if not high takes 1), Disp: , Rfl:     Ozempic, 1 MG/DOSE, 4 MG/3ML solution pen-injector, Inject 1 mg under the skin into the appropriate area as directed 1 (One) Time Per Week., Disp: , Rfl:     pantoprazole (PROTONIX) 40 MG EC tablet, TAKE ONE TABLET BY MOUTH EVERY DAY, Disp: 30 tablet, Rfl: 0    Sofosbuvir-Velpatasvir (Epclusa) 400-100 MG tablet, Take 1 tablet by mouth Daily., Disp: 28 tablet, Rfl: 2    vitamin D (ERGOCALCIFEROL) 1.25 MG (12736 UT) capsule capsule, Take 1 capsule by mouth Daily. Will switch to once a week, Disp: , Rfl:     ALLERGIES:  Motrin [ibuprofen] and Penicillins    VISIT VITALS:  /61 (BP Location: Right arm, Patient Position: Sitting, Cuff Size: Large Adult)   Pulse 72   Ht 182.9 cm (72\")   Wt 117 kg (257 lb 12.8 oz)   SpO2 97%   BMI 34.96 kg/m²     Physical Exam  Constitutional:       General: He is not in acute distress.     Appearance: He is well-developed. He is not diaphoretic.   HENT:     "  Head: Normocephalic and atraumatic.      Right Ear: External ear normal.      Left Ear: External ear normal.      Nose: Nose normal.      Mouth/Throat:      Pharynx: No oropharyngeal exudate.   Eyes:      General: No scleral icterus.        Right eye: No discharge.         Left eye: No discharge.      Conjunctiva/sclera: Conjunctivae normal.      Pupils: Pupils are equal, round, and reactive to light.   Neck:      Thyroid: No thyromegaly.      Vascular: No JVD.      Trachea: No tracheal deviation.   Cardiovascular:      Rate and Rhythm: Normal rate and regular rhythm.      Heart sounds: Normal heart sounds. No murmur heard.     No friction rub. No gallop.   Pulmonary:      Effort: Pulmonary effort is normal. No respiratory distress.      Breath sounds: Normal breath sounds. No stridor. No wheezing or rales.   Chest:      Chest wall: No tenderness.   Abdominal:      General: Bowel sounds are normal. There is no distension.      Palpations: Abdomen is soft. There is no mass.      Tenderness: There is no abdominal tenderness. There is no guarding or rebound.      Hernia: No hernia is present.   Genitourinary:     Rectum: Guaiac result negative.   Musculoskeletal:      Cervical back: Normal range of motion and neck supple.   Lymphadenopathy:      Cervical: No cervical adenopathy.   Skin:     General: Skin is warm and dry.      Coloration: Skin is not pale.      Findings: No erythema or rash.   Neurological:      Mental Status: He is alert and oriented to person, place, and time.      Cranial Nerves: No cranial nerve deficit.      Motor: No abnormal muscle tone.      Coordination: Coordination normal.      Deep Tendon Reflexes: Reflexes are normal and symmetric. Reflexes normal.   Psychiatric:         Behavior: Behavior normal.         Thought Content: Thought content normal.         Judgment: Judgment normal.         Assessment & Plan      Diagnosis Plan   1. Chronic hepatitis C without hepatic coma            Patient  has completed Hepatitis C treatment.  Based on research, patient has now been cured from Hepatitis C due to HCV not detected 12 weeks post treatment.  Patient was educated that  antibodies will always show positive for Hep C.  Patient was also educated that although the Hep C infection is gone,  a new infection can be acquired by being exposed to blood/body fluids of an infected individual.  Patient voiced understanding and agreement.      Patient will continue followup for his cirrhosis with GI at [facility].    Victorino Lee PA-C

## 2025-08-18 ENCOUNTER — TELEPHONE (OUTPATIENT)
Dept: PHARMACY | Facility: HOSPITAL | Age: 60
End: 2025-08-18
Payer: MEDICARE

## (undated) DEVICE — THE BITE BLOCK MAXI, LATEX FREE STRAP IS USED TO PROTECT THE ENDOSCOPE INSERTION TUBE FROM BEING BITTEN BY THE PATIENT.

## (undated) DEVICE — FRCP BX RADJAW4 NDL 2.8 240CM LG OG BX40

## (undated) DEVICE — Device